# Patient Record
Sex: MALE | Race: WHITE | NOT HISPANIC OR LATINO | Employment: OTHER | URBAN - METROPOLITAN AREA
[De-identification: names, ages, dates, MRNs, and addresses within clinical notes are randomized per-mention and may not be internally consistent; named-entity substitution may affect disease eponyms.]

---

## 2023-06-22 LAB
ALANINE AMINOTRANSFERASE (SGPT) (U/L) IN SER/PLAS: 20 U/L (ref 10–52)
ALBUMIN (G/DL) IN SER/PLAS: 4.2 G/DL (ref 3.4–5)
ALKALINE PHOSPHATASE (U/L) IN SER/PLAS: 51 U/L (ref 33–120)
ANION GAP IN SER/PLAS: 13 MMOL/L (ref 10–20)
ASPARTATE AMINOTRANSFERASE (SGOT) (U/L) IN SER/PLAS: 16 U/L (ref 9–39)
BASOPHILS (10*3/UL) IN BLOOD BY AUTOMATED COUNT: 0.03 X10E9/L (ref 0–0.1)
BASOPHILS/100 LEUKOCYTES IN BLOOD BY AUTOMATED COUNT: 0.4 % (ref 0–2)
BILIRUBIN TOTAL (MG/DL) IN SER/PLAS: 0.5 MG/DL (ref 0–1.2)
CALCIUM (MG/DL) IN SER/PLAS: 9.3 MG/DL (ref 8.6–10.3)
CARBON DIOXIDE, TOTAL (MMOL/L) IN SER/PLAS: 25 MMOL/L (ref 21–32)
CHLORIDE (MMOL/L) IN SER/PLAS: 108 MMOL/L (ref 98–107)
CHOLESTEROL (MG/DL) IN SER/PLAS: 198 MG/DL (ref 0–199)
CHOLESTEROL IN HDL (MG/DL) IN SER/PLAS: 53.9 MG/DL
CHOLESTEROL/HDL RATIO: 3.7
CREATININE (MG/DL) IN SER/PLAS: 0.94 MG/DL (ref 0.5–1.3)
EOSINOPHILS (10*3/UL) IN BLOOD BY AUTOMATED COUNT: 0.15 X10E9/L (ref 0–0.7)
EOSINOPHILS/100 LEUKOCYTES IN BLOOD BY AUTOMATED COUNT: 1.9 % (ref 0–6)
ERYTHROCYTE DISTRIBUTION WIDTH (RATIO) BY AUTOMATED COUNT: 13.8 % (ref 11.5–14.5)
ERYTHROCYTE MEAN CORPUSCULAR HEMOGLOBIN CONCENTRATION (G/DL) BY AUTOMATED: 31.8 G/DL (ref 32–36)
ERYTHROCYTE MEAN CORPUSCULAR VOLUME (FL) BY AUTOMATED COUNT: 98 FL (ref 80–100)
ERYTHROCYTES (10*6/UL) IN BLOOD BY AUTOMATED COUNT: 4.35 X10E12/L (ref 4.5–5.9)
GFR MALE: >90 ML/MIN/1.73M2
GLUCOSE (MG/DL) IN SER/PLAS: 94 MG/DL (ref 74–99)
HEMATOCRIT (%) IN BLOOD BY AUTOMATED COUNT: 42.5 % (ref 41–52)
HEMOGLOBIN (G/DL) IN BLOOD: 13.5 G/DL (ref 13.5–17.5)
IMMATURE GRANULOCYTES/100 LEUKOCYTES IN BLOOD BY AUTOMATED COUNT: 0.5 % (ref 0–0.9)
LDL: 111 MG/DL (ref 0–99)
LEUKOCYTES (10*3/UL) IN BLOOD BY AUTOMATED COUNT: 8 X10E9/L (ref 4.4–11.3)
LYMPHOCYTES (10*3/UL) IN BLOOD BY AUTOMATED COUNT: 2.06 X10E9/L (ref 1.2–4.8)
LYMPHOCYTES/100 LEUKOCYTES IN BLOOD BY AUTOMATED COUNT: 25.7 % (ref 13–44)
MONOCYTES (10*3/UL) IN BLOOD BY AUTOMATED COUNT: 1.04 X10E9/L (ref 0.1–1)
MONOCYTES/100 LEUKOCYTES IN BLOOD BY AUTOMATED COUNT: 13 % (ref 2–10)
NEUTROPHILS (10*3/UL) IN BLOOD BY AUTOMATED COUNT: 4.71 X10E9/L (ref 1.2–7.7)
NEUTROPHILS/100 LEUKOCYTES IN BLOOD BY AUTOMATED COUNT: 58.5 % (ref 40–80)
NRBC (PER 100 WBCS) BY AUTOMATED COUNT: 0 /100 WBC (ref 0–0)
PLATELETS (10*3/UL) IN BLOOD AUTOMATED COUNT: 203 X10E9/L (ref 150–450)
POTASSIUM (MMOL/L) IN SER/PLAS: 4.2 MMOL/L (ref 3.5–5.3)
PROTEIN TOTAL: 7.1 G/DL (ref 6.4–8.2)
SODIUM (MMOL/L) IN SER/PLAS: 142 MMOL/L (ref 136–145)
TRIGLYCERIDE (MG/DL) IN SER/PLAS: 166 MG/DL (ref 0–149)
UREA NITROGEN (MG/DL) IN SER/PLAS: 19 MG/DL (ref 6–23)
VLDL: 33 MG/DL (ref 0–40)

## 2023-06-28 ENCOUNTER — LAB (OUTPATIENT)
Dept: LAB | Facility: LAB | Age: 53
End: 2023-06-28
Payer: COMMERCIAL

## 2023-06-28 ENCOUNTER — OFFICE VISIT (OUTPATIENT)
Dept: PRIMARY CARE | Facility: CLINIC | Age: 53
End: 2023-06-28
Payer: COMMERCIAL

## 2023-06-28 VITALS
WEIGHT: 315 LBS | HEIGHT: 70 IN | SYSTOLIC BLOOD PRESSURE: 120 MMHG | BODY MASS INDEX: 45.1 KG/M2 | DIASTOLIC BLOOD PRESSURE: 74 MMHG | TEMPERATURE: 98.4 F | RESPIRATION RATE: 16 BRPM | HEART RATE: 80 BPM

## 2023-06-28 DIAGNOSIS — Z00.00 ROUTINE GENERAL MEDICAL EXAMINATION AT A HEALTH CARE FACILITY: ICD-10-CM

## 2023-06-28 DIAGNOSIS — R79.89 ABNORMAL CBC: ICD-10-CM

## 2023-06-28 DIAGNOSIS — E78.5 DYSLIPIDEMIA: Primary | ICD-10-CM

## 2023-06-28 DIAGNOSIS — I10 HYPERTENSION, UNSPECIFIED TYPE: ICD-10-CM

## 2023-06-28 DIAGNOSIS — M25.59 PAIN IN OTHER JOINT: ICD-10-CM

## 2023-06-28 DIAGNOSIS — J45.909 ASTHMA, UNSPECIFIED ASTHMA SEVERITY, UNSPECIFIED WHETHER COMPLICATED, UNSPECIFIED WHETHER PERSISTENT (HHS-HCC): ICD-10-CM

## 2023-06-28 DIAGNOSIS — Z12.5 PROSTATE CANCER SCREENING: ICD-10-CM

## 2023-06-28 DIAGNOSIS — L03.90 CELLULITIS, UNSPECIFIED CELLULITIS SITE: ICD-10-CM

## 2023-06-28 PROBLEM — I83.812 VARICOSE VEINS OF LEFT LOWER EXTREMITY WITH PAIN: Status: ACTIVE | Noted: 2018-09-05

## 2023-06-28 PROBLEM — E66.01 MORBID OBESITY (MULTI): Status: ACTIVE | Noted: 2023-06-28

## 2023-06-28 PROBLEM — J30.9 ALLERGIC RHINITIS: Status: ACTIVE | Noted: 2023-06-28

## 2023-06-28 PROCEDURE — 83540 ASSAY OF IRON: CPT

## 2023-06-28 PROCEDURE — 99214 OFFICE O/P EST MOD 30 MIN: CPT | Performed by: INTERNAL MEDICINE

## 2023-06-28 PROCEDURE — 1036F TOBACCO NON-USER: CPT | Performed by: INTERNAL MEDICINE

## 2023-06-28 PROCEDURE — 83550 IRON BINDING TEST: CPT

## 2023-06-28 PROCEDURE — 36415 COLL VENOUS BLD VENIPUNCTURE: CPT

## 2023-06-28 PROCEDURE — 82728 ASSAY OF FERRITIN: CPT

## 2023-06-28 PROCEDURE — 3078F DIAST BP <80 MM HG: CPT | Performed by: INTERNAL MEDICINE

## 2023-06-28 PROCEDURE — 3074F SYST BP LT 130 MM HG: CPT | Performed by: INTERNAL MEDICINE

## 2023-06-28 RX ORDER — ALBUTEROL SULFATE 90 UG/1
2 AEROSOL, METERED RESPIRATORY (INHALATION) EVERY 4 HOURS PRN
COMMUNITY
Start: 2017-11-28 | End: 2023-10-06 | Stop reason: SDUPTHER

## 2023-06-28 RX ORDER — LISINOPRIL 10 MG/1
10 TABLET ORAL
Qty: 90 TABLET | Refills: 0 | Status: SHIPPED | OUTPATIENT
Start: 2023-06-28 | End: 2023-10-06 | Stop reason: SDUPTHER

## 2023-06-28 RX ORDER — ALBUTEROL SULFATE 90 UG/1
2 AEROSOL, METERED RESPIRATORY (INHALATION) EVERY 4 HOURS PRN
Qty: 18 G | Refills: 2 | Status: CANCELLED | OUTPATIENT
Start: 2023-06-28

## 2023-06-28 RX ORDER — FLUTICASONE PROPIONATE 50 MCG
1 SPRAY, SUSPENSION (ML) NASAL
Qty: 16 G | Refills: 2 | Status: SHIPPED | OUTPATIENT
Start: 2023-06-28 | End: 2024-01-03 | Stop reason: SDUPTHER

## 2023-06-28 RX ORDER — ETODOLAC 400 MG/1
400 TABLET, FILM COATED ORAL 2 TIMES DAILY
COMMUNITY
End: 2023-06-28 | Stop reason: SDUPTHER

## 2023-06-28 RX ORDER — MONTELUKAST SODIUM 10 MG/1
10 TABLET ORAL NIGHTLY
COMMUNITY
Start: 2018-02-28 | End: 2023-06-28 | Stop reason: SDUPTHER

## 2023-06-28 RX ORDER — LISINOPRIL 10 MG/1
10 TABLET ORAL
COMMUNITY
End: 2023-06-28 | Stop reason: SDUPTHER

## 2023-06-28 RX ORDER — ETODOLAC 400 MG/1
400 TABLET, FILM COATED ORAL 2 TIMES DAILY
Qty: 180 TABLET | Refills: 0 | Status: SHIPPED | OUTPATIENT
Start: 2023-06-28 | End: 2023-10-06 | Stop reason: SDUPTHER

## 2023-06-28 RX ORDER — FLUTICASONE PROPIONATE 50 MCG
1 SPRAY, SUSPENSION (ML) NASAL
COMMUNITY
Start: 2018-09-17 | End: 2023-06-28 | Stop reason: SDUPTHER

## 2023-06-28 RX ORDER — MONTELUKAST SODIUM 10 MG/1
10 TABLET ORAL NIGHTLY
Qty: 90 TABLET | Refills: 0 | Status: SHIPPED | OUTPATIENT
Start: 2023-06-28 | End: 2023-10-06 | Stop reason: SDUPTHER

## 2023-06-28 ASSESSMENT — COLUMBIA-SUICIDE SEVERITY RATING SCALE - C-SSRS
6. HAVE YOU EVER DONE ANYTHING, STARTED TO DO ANYTHING, OR PREPARED TO DO ANYTHING TO END YOUR LIFE?: NO
2. HAVE YOU ACTUALLY HAD ANY THOUGHTS OF KILLING YOURSELF?: NO
1. IN THE PAST MONTH, HAVE YOU WISHED YOU WERE DEAD OR WISHED YOU COULD GO TO SLEEP AND NOT WAKE UP?: NO

## 2023-06-28 ASSESSMENT — PATIENT HEALTH QUESTIONNAIRE - PHQ9
SUM OF ALL RESPONSES TO PHQ9 QUESTIONS 1 AND 2: 0
2. FEELING DOWN, DEPRESSED OR HOPELESS: NOT AT ALL
1. LITTLE INTEREST OR PLEASURE IN DOING THINGS: NOT AT ALL

## 2023-06-28 ASSESSMENT — ENCOUNTER SYMPTOMS: DEPRESSION: 0

## 2023-06-28 NOTE — PROGRESS NOTES
"Subjective   Patient ID: Neptali Estrada is a 52 y.o. male who presents for Follow-up (BW results/Pt was seen in  ER on 6/22/23 for a infection in RLL).  HPI  Keflex and doxy x 1 w    Patient presents today for follow-up for routine for his lipids.  He also has cellulitis last week he has chronic lower extremity edema secondary to arthritic problems and weight gain.  He has seen the vascular doctor Dr. Weldon at [facility] in the past and he has compression hose although he is having trouble getting a new pair.  He developed this rash about 1 week ago and was to the ER was given some IV antibiotics and continued on Keflex and doxy.  His leg is much better today there is no pain there is no warmth he has resolving redness    He is also here to follow-up on his cholesterol and his blood pressure.  His blood pressure is good we reviewed his CMP and lipid panel as well as his CBC.  He needs his iron test    Patient denies chest pain shortness of breath headaches dizziness lightheadedness or shortness of breath he has chronic lower extremity edema he has tried multiple avenues to get new hose and has failed he is going to contact his vascular doctor for different order    Review of Systems  Review of systems was performed and is otherwise negative except as noted in HPI.  Objective   /74   Pulse 80   Temp 36.9 °C (98.4 °F)   Resp 16   Ht 1.778 m (5' 10\")   Wt (!) 186 kg (411 lb)   BMI 58.97 kg/m²    Physical Exam   HEENT is normal  Lungs clear bilaterally  Heart is regular rate and rhythm no murmurs  Lower extremities he has hose bilaterally under the right he has some resolving redness there is no warmth there is no pus there is no drainage there is no open skin and there is no streaks    Assessment/Plan   Diagnoses and all orders for this visit:  Dyslipidemia  Hypertension, unspecified type  -     lisinopril 10 mg tablet; Take 1 tablet (10 mg) by mouth once daily.  Asthma, unspecified asthma severity, unspecified " whether complicated, unspecified whether persistent  -     fluticasone (Flonase) 50 mcg/actuation nasal spray; Administer 1 spray into each nostril once daily.  -     montelukast (Singulair) 10 mg tablet; Take 1 tablet (10 mg) by mouth once daily at bedtime.  Cellulitis, unspecified cellulitis site  Abnormal CBC  -     Iron and TIBC; Future  -     Ferritin; Future  Pain in other joint  -     etodolac (Lodine) 400 mg tablet; Take 1 tablet (400 mg) by mouth 2 times a day.    He is good to follow-up with vascular regarding his cellulitis and getting his new hose  His refills are sent  Blood work is ordered to check his iron  He needs to follow-up with me in 6 months  He will call with issues  He will need blood work before that appointment  Lynne Magana MD

## 2023-06-29 LAB
FERRITIN (UG/LL) IN SER/PLAS: 391 UG/L (ref 20–300)
IRON (UG/DL) IN SER/PLAS: 78 UG/DL (ref 35–150)
IRON BINDING CAPACITY (UG/DL) IN SER/PLAS: 348 UG/DL (ref 240–445)
IRON SATURATION (%) IN SER/PLAS: 22 % (ref 25–45)

## 2023-08-23 LAB
ALANINE AMINOTRANSFERASE (SGPT) (U/L) IN SER/PLAS: 16 U/L (ref 10–52)
ALBUMIN (G/DL) IN SER/PLAS: 4.4 G/DL (ref 3.4–5)
ALKALINE PHOSPHATASE (U/L) IN SER/PLAS: 50 U/L (ref 33–120)
ANION GAP IN SER/PLAS: 11 MMOL/L (ref 10–20)
ASPARTATE AMINOTRANSFERASE (SGOT) (U/L) IN SER/PLAS: 16 U/L (ref 9–39)
BASOPHILS (10*3/UL) IN BLOOD BY AUTOMATED COUNT: 0.05 X10E9/L (ref 0–0.1)
BASOPHILS/100 LEUKOCYTES IN BLOOD BY AUTOMATED COUNT: 0.6 % (ref 0–2)
BILIRUBIN TOTAL (MG/DL) IN SER/PLAS: 0.6 MG/DL (ref 0–1.2)
CALCIUM (MG/DL) IN SER/PLAS: 9.4 MG/DL (ref 8.6–10.3)
CARBON DIOXIDE, TOTAL (MMOL/L) IN SER/PLAS: 28 MMOL/L (ref 21–32)
CHLORIDE (MMOL/L) IN SER/PLAS: 105 MMOL/L (ref 98–107)
CREATININE (MG/DL) IN SER/PLAS: 1.06 MG/DL (ref 0.5–1.3)
EOSINOPHILS (10*3/UL) IN BLOOD BY AUTOMATED COUNT: 0.11 X10E9/L (ref 0–0.7)
EOSINOPHILS/100 LEUKOCYTES IN BLOOD BY AUTOMATED COUNT: 1.3 % (ref 0–6)
ERYTHROCYTE DISTRIBUTION WIDTH (RATIO) BY AUTOMATED COUNT: 13.2 % (ref 11.5–14.5)
ERYTHROCYTE MEAN CORPUSCULAR HEMOGLOBIN CONCENTRATION (G/DL) BY AUTOMATED: 32.1 G/DL (ref 32–36)
ERYTHROCYTE MEAN CORPUSCULAR VOLUME (FL) BY AUTOMATED COUNT: 97 FL (ref 80–100)
ERYTHROCYTES (10*6/UL) IN BLOOD BY AUTOMATED COUNT: 4.3 X10E12/L (ref 4.5–5.9)
GFR MALE: 83 ML/MIN/1.73M2
GLUCOSE (MG/DL) IN SER/PLAS: 79 MG/DL (ref 74–99)
HEMATOCRIT (%) IN BLOOD BY AUTOMATED COUNT: 41.7 % (ref 41–52)
HEMOGLOBIN (G/DL) IN BLOOD: 13.4 G/DL (ref 13.5–17.5)
IMMATURE GRANULOCYTES/100 LEUKOCYTES IN BLOOD BY AUTOMATED COUNT: 0.3 % (ref 0–0.9)
LEUKOCYTES (10*3/UL) IN BLOOD BY AUTOMATED COUNT: 8.6 X10E9/L (ref 4.4–11.3)
LYMPHOCYTES (10*3/UL) IN BLOOD BY AUTOMATED COUNT: 2.08 X10E9/L (ref 1.2–4.8)
LYMPHOCYTES/100 LEUKOCYTES IN BLOOD BY AUTOMATED COUNT: 24.2 % (ref 13–44)
MONOCYTES (10*3/UL) IN BLOOD BY AUTOMATED COUNT: 0.63 X10E9/L (ref 0.1–1)
MONOCYTES/100 LEUKOCYTES IN BLOOD BY AUTOMATED COUNT: 7.3 % (ref 2–10)
NEUTROPHILS (10*3/UL) IN BLOOD BY AUTOMATED COUNT: 5.71 X10E9/L (ref 1.2–7.7)
NEUTROPHILS/100 LEUKOCYTES IN BLOOD BY AUTOMATED COUNT: 66.3 % (ref 40–80)
NRBC (PER 100 WBCS) BY AUTOMATED COUNT: 0 /100 WBC (ref 0–0)
PLATELETS (10*3/UL) IN BLOOD AUTOMATED COUNT: 241 X10E9/L (ref 150–450)
POTASSIUM (MMOL/L) IN SER/PLAS: 4.4 MMOL/L (ref 3.5–5.3)
PROTEIN TOTAL: 7.2 G/DL (ref 6.4–8.2)
SODIUM (MMOL/L) IN SER/PLAS: 140 MMOL/L (ref 136–145)
UREA NITROGEN (MG/DL) IN SER/PLAS: 19 MG/DL (ref 6–23)

## 2023-09-18 ENCOUNTER — TELEPHONE (OUTPATIENT)
Dept: PRIMARY CARE | Facility: CLINIC | Age: 53
End: 2023-09-18

## 2023-09-18 NOTE — TELEPHONE ENCOUNTER
Pharmacy called ldm on machine requesting Rx refill on the   Compound Pain Cream for the patient.  There is a fax refill request   in your middle bin.  PLS 06/28/23

## 2023-10-06 DIAGNOSIS — I10 HYPERTENSION, UNSPECIFIED TYPE: ICD-10-CM

## 2023-10-06 DIAGNOSIS — J45.909 REACTIVE AIRWAY DISEASE WITHOUT COMPLICATION, UNSPECIFIED ASTHMA SEVERITY, UNSPECIFIED WHETHER PERSISTENT (HHS-HCC): ICD-10-CM

## 2023-10-06 DIAGNOSIS — J45.909 ASTHMA, UNSPECIFIED ASTHMA SEVERITY, UNSPECIFIED WHETHER COMPLICATED, UNSPECIFIED WHETHER PERSISTENT (HHS-HCC): ICD-10-CM

## 2023-10-06 DIAGNOSIS — M25.59 PAIN IN OTHER JOINT: ICD-10-CM

## 2023-10-06 RX ORDER — LISINOPRIL 10 MG/1
10 TABLET ORAL
Qty: 90 TABLET | Refills: 0 | Status: SHIPPED | OUTPATIENT
Start: 2023-10-06 | End: 2023-10-10 | Stop reason: DRUGHIGH

## 2023-10-06 RX ORDER — ETODOLAC 400 MG/1
400 TABLET, FILM COATED ORAL 2 TIMES DAILY
Qty: 180 TABLET | Refills: 0 | Status: SHIPPED | OUTPATIENT
Start: 2023-10-06 | End: 2024-01-03 | Stop reason: SDUPTHER

## 2023-10-06 RX ORDER — MONTELUKAST SODIUM 10 MG/1
10 TABLET ORAL NIGHTLY
Qty: 90 TABLET | Refills: 0 | Status: SHIPPED | OUTPATIENT
Start: 2023-10-06 | End: 2024-01-03 | Stop reason: SDUPTHER

## 2023-10-06 RX ORDER — LISINOPRIL 20 MG/1
20 TABLET ORAL DAILY
Qty: 30 TABLET | Refills: 0 | OUTPATIENT
Start: 2023-10-06

## 2023-10-06 RX ORDER — ALBUTEROL SULFATE 90 UG/1
2 AEROSOL, METERED RESPIRATORY (INHALATION) EVERY 4 HOURS PRN
Qty: 18 G | Refills: 1 | Status: SHIPPED | OUTPATIENT
Start: 2023-10-06 | End: 2024-01-03 | Stop reason: SDUPTHER

## 2023-10-10 ENCOUNTER — TELEPHONE (OUTPATIENT)
Dept: PRIMARY CARE | Facility: CLINIC | Age: 53
End: 2023-10-10

## 2023-10-10 DIAGNOSIS — I10 ESSENTIAL HYPERTENSION: Primary | ICD-10-CM

## 2023-10-10 RX ORDER — LISINOPRIL 20 MG/1
20 TABLET ORAL DAILY
COMMUNITY
End: 2023-10-10 | Stop reason: SDUPTHER

## 2023-10-10 RX ORDER — LISINOPRIL 20 MG/1
20 TABLET ORAL DAILY
Qty: 90 TABLET | Refills: 0 | Status: SHIPPED | OUTPATIENT
Start: 2023-10-10 | End: 2024-01-03 | Stop reason: SDUPTHER

## 2023-10-10 NOTE — TELEPHONE ENCOUNTER
Pt called stating when he went to the pharmacy to  his lisinopril it was for the wrong dose.  It was sent in for 10 mg  and should be 20 mg.  Please resend to [facility].

## 2023-10-20 DIAGNOSIS — J45.909 ASTHMA, UNSPECIFIED ASTHMA SEVERITY, UNSPECIFIED WHETHER COMPLICATED, UNSPECIFIED WHETHER PERSISTENT (HHS-HCC): ICD-10-CM

## 2023-10-20 RX ORDER — CETIRIZINE HYDROCHLORIDE 10 MG/1
10 TABLET ORAL DAILY
COMMUNITY
End: 2024-01-03 | Stop reason: ALTCHOICE

## 2023-10-20 RX ORDER — CETIRIZINE HYDROCHLORIDE 10 MG/1
10 TABLET ORAL DAILY
Qty: 30 TABLET | Refills: 2 | Status: SHIPPED | OUTPATIENT
Start: 2023-10-20 | End: 2024-01-03 | Stop reason: SDUPTHER

## 2023-12-01 DIAGNOSIS — G89.29 CHRONIC PAIN OF LEFT KNEE: ICD-10-CM

## 2023-12-01 DIAGNOSIS — M25.562 CHRONIC PAIN OF LEFT KNEE: ICD-10-CM

## 2023-12-05 ENCOUNTER — OFFICE VISIT (OUTPATIENT)
Dept: ORTHOPEDIC SURGERY | Facility: CLINIC | Age: 53
End: 2023-12-05
Payer: COMMERCIAL

## 2023-12-05 ENCOUNTER — ANCILLARY PROCEDURE (OUTPATIENT)
Dept: RADIOLOGY | Facility: CLINIC | Age: 53
End: 2023-12-05
Payer: COMMERCIAL

## 2023-12-05 DIAGNOSIS — G89.29 CHRONIC PAIN OF LEFT KNEE: ICD-10-CM

## 2023-12-05 DIAGNOSIS — M25.562 CHRONIC PAIN OF LEFT KNEE: ICD-10-CM

## 2023-12-05 DIAGNOSIS — M17.12 ARTHRITIS OF KNEE, LEFT: Primary | ICD-10-CM

## 2023-12-05 PROCEDURE — 73562 X-RAY EXAM OF KNEE 3: CPT | Mod: LEFT SIDE | Performed by: RADIOLOGY

## 2023-12-05 PROCEDURE — 99213 OFFICE O/P EST LOW 20 MIN: CPT | Performed by: ORTHOPAEDIC SURGERY

## 2023-12-05 PROCEDURE — 73562 X-RAY EXAM OF KNEE 3: CPT | Mod: LT

## 2023-12-05 PROCEDURE — 20610 DRAIN/INJ JOINT/BURSA W/O US: CPT | Performed by: ORTHOPAEDIC SURGERY

## 2023-12-05 PROCEDURE — 1036F TOBACCO NON-USER: CPT | Performed by: ORTHOPAEDIC SURGERY

## 2023-12-05 RX ORDER — LIDOCAINE HYDROCHLORIDE 10 MG/ML
2 INJECTION INFILTRATION; PERINEURAL
Status: COMPLETED | OUTPATIENT
Start: 2023-12-05 | End: 2023-12-05

## 2023-12-05 RX ORDER — TRIAMCINOLONE ACETONIDE 40 MG/ML
40 INJECTION, SUSPENSION INTRA-ARTICULAR; INTRAMUSCULAR
Status: COMPLETED | OUTPATIENT
Start: 2023-12-05 | End: 2023-12-05

## 2023-12-05 RX ADMIN — TRIAMCINOLONE ACETONIDE 40 MG: 40 INJECTION, SUSPENSION INTRA-ARTICULAR; INTRAMUSCULAR at 12:32

## 2023-12-05 RX ADMIN — LIDOCAINE HYDROCHLORIDE 2 ML: 10 INJECTION INFILTRATION; PERINEURAL at 12:32

## 2023-12-05 NOTE — PROGRESS NOTES
53-year-old is seen with left knee pain.  He has been having persistent severe sharp shooting pain in the left knee.  He has difficulty with standing and walking going up and down stairs and getting up and down from a chair in and out of a car.  Viscosupplementation and cortisone have helped him in the past.  He is working on weight loss and works with a nutritionist.    Pleasant and no acute distress. Walks with antalgic gait. Stands with varus alignment of the left knee and neutral on the right. Left knee range of motion is 5-110°. The knee is stable to varus and valgus stress Lachman and posterior drawer. There is a mild effusion. There is generalized tenderness. Right knee range of motion is 0-120°. There is no effusion. The knee is stable to varus and valgus stress Lachman and posterior drawer. Both lower extremities are well perfused the skin is intact and muscle tone is adequate.    A discussion about knee arthritis was done.  Treatment options were reviewed and the decision was made to proceed with cortisone injection.  Viscosupplementation injections will also be repeated as these have helped him.  He has also been advised to work with the weight management program to work further on weight loss.  It is current weight he would not be a surgical candidate.    L Inj/Asp: L knee on 12/5/2023 12:32 PM  Indications: pain  Details: 22 G needle, anterolateral approach  Medications: 40 mg triamcinolone acetonide 40 mg/mL; 2 mL lidocaine 10 mg/mL (1 %)  Procedure, treatment alternatives, risks and benefits explained, specific risks discussed. Consent was given by the patient.

## 2023-12-26 ENCOUNTER — LAB (OUTPATIENT)
Dept: LAB | Facility: LAB | Age: 53
End: 2023-12-26
Payer: COMMERCIAL

## 2023-12-26 DIAGNOSIS — Z00.00 ROUTINE GENERAL MEDICAL EXAMINATION AT A HEALTH CARE FACILITY: ICD-10-CM

## 2023-12-26 DIAGNOSIS — Z12.5 PROSTATE CANCER SCREENING: ICD-10-CM

## 2023-12-26 LAB
ALBUMIN SERPL BCP-MCNC: 4.3 G/DL (ref 3.4–5)
ALP SERPL-CCNC: 59 U/L (ref 33–120)
ALT SERPL W P-5'-P-CCNC: 18 U/L (ref 10–52)
ANION GAP SERPL CALC-SCNC: 12 MMOL/L (ref 10–20)
AST SERPL W P-5'-P-CCNC: 15 U/L (ref 9–39)
BASOPHILS # BLD AUTO: 0.03 X10*3/UL (ref 0–0.1)
BASOPHILS NFR BLD AUTO: 0.4 %
BILIRUB SERPL-MCNC: 0.4 MG/DL (ref 0–1.2)
BUN SERPL-MCNC: 17 MG/DL (ref 6–23)
CALCIUM SERPL-MCNC: 9.1 MG/DL (ref 8.6–10.3)
CHLORIDE SERPL-SCNC: 105 MMOL/L (ref 98–107)
CHOLEST SERPL-MCNC: 220 MG/DL (ref 0–199)
CHOLESTEROL/HDL RATIO: 4.6
CO2 SERPL-SCNC: 28 MMOL/L (ref 21–32)
CREAT SERPL-MCNC: 0.98 MG/DL (ref 0.5–1.3)
EOSINOPHIL # BLD AUTO: 0.12 X10*3/UL (ref 0–0.7)
EOSINOPHIL NFR BLD AUTO: 1.6 %
ERYTHROCYTE [DISTWIDTH] IN BLOOD BY AUTOMATED COUNT: 13.2 % (ref 11.5–14.5)
GFR SERPL CREATININE-BSD FRML MDRD: >90 ML/MIN/1.73M*2
GLUCOSE SERPL-MCNC: 83 MG/DL (ref 74–99)
HCT VFR BLD AUTO: 43.7 % (ref 41–52)
HDLC SERPL-MCNC: 47.6 MG/DL
HGB BLD-MCNC: 13.9 G/DL (ref 13.5–17.5)
IMM GRANULOCYTES # BLD AUTO: 0.04 X10*3/UL (ref 0–0.7)
IMM GRANULOCYTES NFR BLD AUTO: 0.5 % (ref 0–0.9)
LDLC SERPL CALC-MCNC: 134 MG/DL
LYMPHOCYTES # BLD AUTO: 2.09 X10*3/UL (ref 1.2–4.8)
LYMPHOCYTES NFR BLD AUTO: 27.2 %
MCH RBC QN AUTO: 31.4 PG (ref 26–34)
MCHC RBC AUTO-ENTMCNC: 31.8 G/DL (ref 32–36)
MCV RBC AUTO: 99 FL (ref 80–100)
MONOCYTES # BLD AUTO: 0.66 X10*3/UL (ref 0.1–1)
MONOCYTES NFR BLD AUTO: 8.6 %
NEUTROPHILS # BLD AUTO: 4.75 X10*3/UL (ref 1.2–7.7)
NEUTROPHILS NFR BLD AUTO: 61.7 %
NON HDL CHOLESTEROL: 172 MG/DL (ref 0–149)
NRBC BLD-RTO: 0 /100 WBCS (ref 0–0)
PLATELET # BLD AUTO: 242 X10*3/UL (ref 150–450)
POTASSIUM SERPL-SCNC: 4.6 MMOL/L (ref 3.5–5.3)
PROT SERPL-MCNC: 6.9 G/DL (ref 6.4–8.2)
PSA SERPL-MCNC: 0.97 NG/ML
RBC # BLD AUTO: 4.42 X10*6/UL (ref 4.5–5.9)
SODIUM SERPL-SCNC: 140 MMOL/L (ref 136–145)
TRIGL SERPL-MCNC: 193 MG/DL (ref 0–149)
TSH SERPL-ACNC: 1.1 MIU/L (ref 0.44–3.98)
VLDL: 39 MG/DL (ref 0–40)
WBC # BLD AUTO: 7.7 X10*3/UL (ref 4.4–11.3)

## 2023-12-26 PROCEDURE — 84443 ASSAY THYROID STIM HORMONE: CPT

## 2023-12-26 PROCEDURE — 84153 ASSAY OF PSA TOTAL: CPT

## 2023-12-26 PROCEDURE — 36415 COLL VENOUS BLD VENIPUNCTURE: CPT

## 2023-12-26 PROCEDURE — 85025 COMPLETE CBC W/AUTO DIFF WBC: CPT

## 2023-12-26 PROCEDURE — 80061 LIPID PANEL: CPT

## 2023-12-26 PROCEDURE — 80053 COMPREHEN METABOLIC PANEL: CPT

## 2024-01-03 ENCOUNTER — TELEPHONE (OUTPATIENT)
Dept: PRIMARY CARE | Facility: CLINIC | Age: 54
End: 2024-01-03

## 2024-01-03 ENCOUNTER — LAB (OUTPATIENT)
Dept: LAB | Facility: LAB | Age: 54
End: 2024-01-03
Payer: COMMERCIAL

## 2024-01-03 ENCOUNTER — OFFICE VISIT (OUTPATIENT)
Dept: PRIMARY CARE | Facility: CLINIC | Age: 54
End: 2024-01-03
Payer: COMMERCIAL

## 2024-01-03 VITALS
HEART RATE: 108 BPM | BODY MASS INDEX: 45.1 KG/M2 | DIASTOLIC BLOOD PRESSURE: 90 MMHG | SYSTOLIC BLOOD PRESSURE: 132 MMHG | OXYGEN SATURATION: 95 % | HEIGHT: 70 IN | WEIGHT: 315 LBS | TEMPERATURE: 98.1 F

## 2024-01-03 DIAGNOSIS — J45.909 REACTIVE AIRWAY DISEASE WITHOUT COMPLICATION, UNSPECIFIED ASTHMA SEVERITY, UNSPECIFIED WHETHER PERSISTENT (HHS-HCC): ICD-10-CM

## 2024-01-03 DIAGNOSIS — J45.909 ASTHMA, UNSPECIFIED ASTHMA SEVERITY, UNSPECIFIED WHETHER COMPLICATED, UNSPECIFIED WHETHER PERSISTENT (HHS-HCC): ICD-10-CM

## 2024-01-03 DIAGNOSIS — R79.89 ABNORMAL CBC: ICD-10-CM

## 2024-01-03 DIAGNOSIS — E78.5 DYSLIPIDEMIA: ICD-10-CM

## 2024-01-03 DIAGNOSIS — Z12.11 COLON CANCER SCREENING: ICD-10-CM

## 2024-01-03 DIAGNOSIS — M25.59 PAIN IN OTHER JOINT: ICD-10-CM

## 2024-01-03 DIAGNOSIS — I10 ESSENTIAL HYPERTENSION: Primary | ICD-10-CM

## 2024-01-03 DIAGNOSIS — E66.1 CLASS 3 DRUG-INDUCED OBESITY WITH BODY MASS INDEX (BMI) OF 50.0 TO 59.9 IN ADULT, UNSPECIFIED WHETHER SERIOUS COMORBIDITY PRESENT (MULTI): ICD-10-CM

## 2024-01-03 PROBLEM — E66.813 CLASS 3 DRUG-INDUCED OBESITY WITH BODY MASS INDEX (BMI) OF 50.0 TO 59.9 IN ADULT: Status: ACTIVE | Noted: 2024-01-03

## 2024-01-03 LAB
FERRITIN SERPL-MCNC: 389 NG/ML (ref 20–300)
IRON SATN MFR SERPL: 26 % (ref 25–45)
IRON SERPL-MCNC: 89 UG/DL (ref 35–150)
TIBC SERPL-MCNC: 341 UG/DL (ref 240–445)
UIBC SERPL-MCNC: 252 UG/DL (ref 110–370)

## 2024-01-03 PROCEDURE — 83540 ASSAY OF IRON: CPT

## 2024-01-03 PROCEDURE — 1036F TOBACCO NON-USER: CPT | Performed by: INTERNAL MEDICINE

## 2024-01-03 PROCEDURE — 82728 ASSAY OF FERRITIN: CPT

## 2024-01-03 PROCEDURE — 83550 IRON BINDING TEST: CPT

## 2024-01-03 PROCEDURE — 3008F BODY MASS INDEX DOCD: CPT | Performed by: INTERNAL MEDICINE

## 2024-01-03 PROCEDURE — 3080F DIAST BP >= 90 MM HG: CPT | Performed by: INTERNAL MEDICINE

## 2024-01-03 PROCEDURE — 3075F SYST BP GE 130 - 139MM HG: CPT | Performed by: INTERNAL MEDICINE

## 2024-01-03 PROCEDURE — 99214 OFFICE O/P EST MOD 30 MIN: CPT | Performed by: INTERNAL MEDICINE

## 2024-01-03 PROCEDURE — 36415 COLL VENOUS BLD VENIPUNCTURE: CPT

## 2024-01-03 RX ORDER — LISINOPRIL 20 MG/1
20 TABLET ORAL DAILY
Qty: 30 TABLET | Refills: 2 | Status: SHIPPED | OUTPATIENT
Start: 2024-01-03 | End: 2024-04-03 | Stop reason: SDUPTHER

## 2024-01-03 RX ORDER — ETODOLAC 400 MG/1
400 TABLET, FILM COATED ORAL 2 TIMES DAILY
Qty: 60 TABLET | Refills: 2 | Status: SHIPPED | OUTPATIENT
Start: 2024-01-03 | End: 2024-04-03 | Stop reason: SDUPTHER

## 2024-01-03 RX ORDER — FLUTICASONE PROPIONATE 50 MCG
1 SPRAY, SUSPENSION (ML) NASAL
Qty: 16 G | Refills: 2 | Status: SHIPPED | OUTPATIENT
Start: 2024-01-03 | End: 2024-04-03 | Stop reason: SDUPTHER

## 2024-01-03 RX ORDER — MONTELUKAST SODIUM 10 MG/1
10 TABLET ORAL NIGHTLY
Qty: 30 TABLET | Refills: 2 | Status: SHIPPED | OUTPATIENT
Start: 2024-01-03 | End: 2024-04-03 | Stop reason: SDUPTHER

## 2024-01-03 RX ORDER — ATORVASTATIN CALCIUM 10 MG/1
10 TABLET, FILM COATED ORAL DAILY
Qty: 30 TABLET | Refills: 2 | Status: SHIPPED | OUTPATIENT
Start: 2024-01-03 | End: 2024-04-03 | Stop reason: SDUPTHER

## 2024-01-03 RX ORDER — ALBUTEROL SULFATE 90 UG/1
2 AEROSOL, METERED RESPIRATORY (INHALATION) EVERY 4 HOURS PRN
Qty: 18 G | Refills: 2 | Status: SHIPPED | OUTPATIENT
Start: 2024-01-03

## 2024-01-03 RX ORDER — CETIRIZINE HYDROCHLORIDE 10 MG/1
10 TABLET ORAL DAILY
Qty: 30 TABLET | Refills: 2 | Status: SHIPPED | OUTPATIENT
Start: 2024-01-03 | End: 2024-04-03 | Stop reason: SDUPTHER

## 2024-01-03 ASSESSMENT — PATIENT HEALTH QUESTIONNAIRE - PHQ9
1. LITTLE INTEREST OR PLEASURE IN DOING THINGS: NOT AT ALL
SUM OF ALL RESPONSES TO PHQ9 QUESTIONS 1 AND 2: 0
2. FEELING DOWN, DEPRESSED OR HOPELESS: NOT AT ALL

## 2024-01-03 ASSESSMENT — ENCOUNTER SYMPTOMS
LOSS OF SENSATION IN FEET: 0
DEPRESSION: 0
OCCASIONAL FEELINGS OF UNSTEADINESS: 1

## 2024-01-03 NOTE — PROGRESS NOTES
"Subjective   Patient ID: Neptali Estrada is a 53 y.o. male who presents for Follow-up (EP.  Follow up B/P.  Labs done.  No concerns.).  HPI  Recent cortisone shot left knee and pierce be getting gel    Patient presents today for follow-up of hyperlipidemia hypertension allergic rhinitis.  As well as asthma.  He has been feeling well.  He maintains on his current medications without issues he is still using Lodine for knee pain.  He was had a recent knee injection of cortisone and he plans on getting gel injections when they come in at his doctor's office he states he will be a series of 3 and is hoping that will help him get through the winter.  He has been compliant with his cholesterol medicine and his blood pressure meds.  His blood pressure slightly borderline here in the office today.  Patient states that he is continuing to work on his weight loss efforts trying to watch his diet he is not really able to exercise well because of the knee pain but he wants to continue to lose weight.  He does not have any lower extremity edema.    Review of Systems  Review of systems was performed and is otherwise negative except as noted in HPI.  Objective   BP (!) 136/94   Pulse 108   Temp 36.7 °C (98.1 °F) (Oral)   Ht 1.778 m (5' 10\")   Wt (!) 183 kg (403 lb)   SpO2 95%   BMI 57.82 kg/m²    Physical Exam  HEENT is normal  Lungs clear bilaterally  Heart is regular rate rhythm no murmurs  Abdomen benign  Lower extremities no edema  He has bilateral bony deformities of his knees  Assessment/Plan   Diagnoses and all orders for this visit:  Essential hypertension  -     lisinopril 20 mg tablet; Take 1 tablet (20 mg) by mouth once daily.  Asthma, unspecified asthma severity, unspecified whether complicated, unspecified whether persistent  -     montelukast (Singulair) 10 mg tablet; Take 1 tablet (10 mg) by mouth once daily at bedtime.  -     fluticasone (Flonase) 50 mcg/actuation nasal spray; Administer 1 spray into each nostril " once daily.  -     cetirizine (ZyrTEC) 10 mg tablet; Take 1 tablet (10 mg) by mouth once daily.  Pain in other joint  -     etodolac (Lodine) 400 mg tablet; Take 1 tablet (400 mg) by mouth 2 times a day.  Reactive airway disease without complication, unspecified asthma severity, unspecified whether persistent  -     albuterol (Ventolin HFA) 90 mcg/actuation inhaler; Inhale 2 puffs every 4 hours if needed for wheezing.  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  -     Follow Up In [facility] - PCP - Established; Future    Call with issues  Refills are sent  Have him return for nurse visit for blood pressure check in 2 to 3 weeks  He will continue to work on diet and exercise  Blood work ordered for his next visit  We reviewed his blood work today CBC lipid panel thyroid panel chemistry and PSA  Lynne Magana MD

## 2024-01-04 NOTE — TELEPHONE ENCOUNTER
Please call patient I would like him to return to the office for nurse visit for blood pressure check in about 3 weeks can you please set that up

## 2024-01-17 DIAGNOSIS — M17.12 PRIMARY OSTEOARTHRITIS OF LEFT KNEE: ICD-10-CM

## 2024-01-18 ENCOUNTER — TELEPHONE (OUTPATIENT)
Dept: GASTROENTEROLOGY | Facility: CLINIC | Age: 54
End: 2024-01-18
Payer: COMMERCIAL

## 2024-01-18 RX ORDER — POLYETHYLENE GLYCOL 3350, SODIUM SULFATE ANHYDROUS, SODIUM BICARBONATE, SODIUM CHLORIDE, POTASSIUM CHLORIDE 236; 22.74; 6.74; 5.86; 2.97 G/4L; G/4L; G/4L; G/4L; G/4L
4000 POWDER, FOR SOLUTION ORAL ONCE
Qty: 4000 ML | Refills: 0 | Status: CANCELLED | OUTPATIENT
Start: 2024-01-18 | End: 2024-01-18

## 2024-01-19 ENCOUNTER — TELEMEDICINE (OUTPATIENT)
Dept: GASTROENTEROLOGY | Facility: CLINIC | Age: 54
End: 2024-01-19
Payer: COMMERCIAL

## 2024-01-19 ENCOUNTER — TELEPHONE (OUTPATIENT)
Dept: GASTROENTEROLOGY | Facility: CLINIC | Age: 54
End: 2024-01-19

## 2024-01-19 DIAGNOSIS — Z11.59 ENCOUNTER FOR SCREENING FOR OTHER VIRAL DISEASES: ICD-10-CM

## 2024-01-19 DIAGNOSIS — Z12.11 COLON CANCER SCREENING: Primary | ICD-10-CM

## 2024-01-19 DIAGNOSIS — K44.9 HIATAL HERNIA: ICD-10-CM

## 2024-01-19 PROCEDURE — 99204 OFFICE O/P NEW MOD 45 MIN: CPT | Performed by: STUDENT IN AN ORGANIZED HEALTH CARE EDUCATION/TRAINING PROGRAM

## 2024-01-19 NOTE — PROGRESS NOTES
53-year-old gentleman with history of dyslipidemia hypertension CVA sleep apnea on CPAP, allergic asthma, small hiatal hernia is interviewed virtually because of the storm warning for consideration of colonoscopy.  Patient mentioned having colonoscopy around year 2000 which was unremarkable as per him.  He mentions intermittent small amount of blood in the stools.    EGD 2000 for heartburn, gastritis, small hiatal hernia, biopsy of the stomach unremarkable  Colonoscopy around 8/2000 unremarkable as per patient  Family history reviewed, not pertinent to chief complaint  1 bowel movement per day  Denies NSAIDs, drinks 5 drinks over the weekends, denies marijuana drug use, ex-smoker        The note was created using voice recognition transcription software. Despite proofreading, unintentional typographical errors may be present. Please contact the GI office with any questions or concerns.     Current Medications: reviewed    A 10 point review of system is negative except for what is mentioned in the HPI    Follow up with GI was advised       Vital Signs: Reviewed    Physical Exam:  Not performed because it was a virtual encounter    Investigations:  Labs, radiological imaging and cardiac work up were reviewed      1-screen for hepatitis C    2-BMI 57, advised to consider Ozempic versus others if no contraindications, patient mentioned that he will follow with his primary physician    3-Healthcare maintenance, reported small blood in the stools, will reach out to sleep specialist at [facility] Nicole Starkey for clearance, will follow    4-advised against alcohol overuse

## 2024-01-19 NOTE — TELEPHONE ENCOUNTER
----- Message from Seamus Hamlin MD sent at 1/19/2024  8:48 AM EST -----  Good morning Dariana can you please send a request for clearance for colonoscopy to [facility] NP sleep apnea specialist Nicole pearson thank you

## 2024-01-19 NOTE — TELEPHONE ENCOUNTER
JAMEL for the patient that we are changing his in person visit with Dr. Hamlin on 1/19/24 to a virtual visit. If the patient is unable to change, I instructed him to call the office tomorrow morning to reschedule.

## 2024-01-22 ENCOUNTER — OFFICE VISIT (OUTPATIENT)
Dept: ORTHOPEDIC SURGERY | Facility: CLINIC | Age: 54
End: 2024-01-22
Payer: COMMERCIAL

## 2024-01-22 VITALS — BODY MASS INDEX: 45.1 KG/M2 | WEIGHT: 315 LBS | HEIGHT: 70 IN

## 2024-01-22 DIAGNOSIS — M25.512 ACUTE PAIN OF LEFT SHOULDER: Primary | ICD-10-CM

## 2024-01-22 PROCEDURE — 3008F BODY MASS INDEX DOCD: CPT

## 2024-01-22 PROCEDURE — 99214 OFFICE O/P EST MOD 30 MIN: CPT

## 2024-01-22 PROCEDURE — 1036F TOBACCO NON-USER: CPT

## 2024-01-22 NOTE — PROGRESS NOTES
Subjective    Patient ID: Neptali Estrada is a 53 y.o. male.    Chief Complaint: Follow-up of the Left Shoulder (BICEP)     HPI  This is a pleasant 53-year-old right-hand-dominant male presents to the office for evaluation of left shoulder pain, which has been ongoing since January 13, 2024.  Patient explains that he was reaching with his left arm, when he felt a significant tearing/popping sensation to the anterior proximal aspect of left shoulder.  States that he immediately experienced pain and limited range of motion.  Over the next few days, he began to notice bruising down his left arm.  Explains that with any type of overhead reaching activity, he is apparent seeing a sharp stabbing pain.  He presented to the [facility] emergency department in [place] on Saturday, January 20, where multiple view x-rays of his humerus were obtained.  These x-rays did include his left shoulder and elbow.  X-rays were negative.  He also had an ultrasound of his left upper extremity which was negative for DVT.  He was advised to follow-up with orthopedics.  Presents to the office today with continued complaints of left shoulder pain weakness and limited range of motion.  He continues to have bruising.  He is having significant difficulty with overhead reaching activities as well as reaching behind his back.  He cannot lift objects with his left arm.  He has been taking Tylenol as well as applying heat and ice with very minimal relief of symptoms.  He takes etodolac for bilateral knee pain and was advised by his primary care physician not to take other NSAIDs.  He denies this and paresthesia of left upper extremity.  Patient states that he has had a previous right shoulder arthroscopy in the past many years ago for a labrum tear.  He is currently unemployed.    The patient's past medical, surgical, family, and social history as well as allergies and medications were reviewed and updated in the chart.    Objective   Ortho  Exam  Pleasant and no acute distress.  Left shoulder forward flexion 95°.  External rotation to approximately 60 degrees with pain elicited.  Limited active abduction to 80 degrees.  No effusion or instability.  There is noted to be ecchymosis to medial upper arm.  There is positive Neer and Wise impingement. There is subacromial crepitus and tenderness around the subacromial space.  There is biceps tenderness.  Bulging of bicep muscle distally. There is a positive Douglass's test. No acromioclavicular tenderness. Rotator cuff strength is decreased and there is discomfort with strength testing. Right shoulder forward flexion 180°. No effusion or instability. No impingement or crepitus or tenderness involving the subacromial space. No biceps or acromioclavicular tenderness. There is intact rotator cuff strength. There is adequate range of motion of the cervical spine without pain. Both upper extremities are well perfused skin is intact and muscle tone is adequate. Elbow flexion and extension and wrist flexion and extension strength are intact.    Image Results:  Multiple view x-rays of the left humerus obtained at [facility] on January 20, 2024 personally reviewed, without evidence of acute fracture or dislocation.  Entirety of left shoulder and elbow were also viewed on the x-rays.    Assessment/Plan   Encounter Diagnoses:  Acute pain of left shoulder, left proximal bicep tear, suspicion for left rotator cuff tear    Plan: Discussion with patient about differential diagnoses with review of x-rays.  Based on patient's acute injury, symptoms and physical exam today, there is concern for internal derangement of the left shoulder.  Explained to patient that he has most likely sustained a proximal bicep tear, but there is concern for a rotator cuff tear.  He should obtain an MRI of the left shoulder to assess for rotator cuff damage.  He is aware that there could be possible surgical intervention pending  MRI results.  Explained to patient that in the meantime, he should avoid aggravating activities.  He can continue with Tylenol, topical creams and ice and heat application.  Once MRI results are complete, I will contact patient for further treatment options.    Orders Placed This Encounter    MR shoulder left wo IV contrast

## 2024-01-25 ENCOUNTER — APPOINTMENT (OUTPATIENT)
Dept: PRIMARY CARE | Facility: CLINIC | Age: 54
End: 2024-01-25
Payer: COMMERCIAL

## 2024-01-29 ENCOUNTER — CLINICAL SUPPORT (OUTPATIENT)
Dept: PRIMARY CARE | Facility: CLINIC | Age: 54
End: 2024-01-29
Payer: COMMERCIAL

## 2024-01-29 VITALS — HEART RATE: 88 BPM | DIASTOLIC BLOOD PRESSURE: 80 MMHG | SYSTOLIC BLOOD PRESSURE: 130 MMHG

## 2024-01-29 DIAGNOSIS — I10 HYPERTENSION, UNSPECIFIED TYPE: ICD-10-CM

## 2024-01-29 NOTE — PROGRESS NOTES
Nurse visit for BP check.  Elevated at last office visit with Dr. Magana.  Taking Lisinopril daily.  BP /88, pulse 88.  Had pt sit for a few minutes.  Re-checked BP.  BP / 80.  Advised pt that a message will be sent to Dr. Magana for review.  SOHAIL Noble LPN

## 2024-01-30 DIAGNOSIS — E66.1 CLASS 3 DRUG-INDUCED OBESITY WITH BODY MASS INDEX (BMI) OF 50.0 TO 59.9 IN ADULT, UNSPECIFIED WHETHER SERIOUS COMORBIDITY PRESENT (MULTI): ICD-10-CM

## 2024-01-30 DIAGNOSIS — J45.909 REACTIVE AIRWAY DISEASE WITHOUT COMPLICATION, UNSPECIFIED ASTHMA SEVERITY, UNSPECIFIED WHETHER PERSISTENT (HHS-HCC): ICD-10-CM

## 2024-01-30 DIAGNOSIS — I83.812 VARICOSE VEINS OF LEFT LOWER EXTREMITY WITH PAIN: ICD-10-CM

## 2024-02-01 ENCOUNTER — OFFICE VISIT (OUTPATIENT)
Dept: ORTHOPEDIC SURGERY | Facility: CLINIC | Age: 54
End: 2024-02-01
Payer: COMMERCIAL

## 2024-02-01 VITALS — HEIGHT: 70 IN | WEIGHT: 315 LBS | BODY MASS INDEX: 45.1 KG/M2

## 2024-02-01 DIAGNOSIS — M17.12 ARTHRITIS OF KNEE, LEFT: Primary | ICD-10-CM

## 2024-02-01 PROCEDURE — 99024 POSTOP FOLLOW-UP VISIT: CPT

## 2024-02-01 PROCEDURE — 20610 DRAIN/INJ JOINT/BURSA W/O US: CPT

## 2024-02-01 PROCEDURE — 3008F BODY MASS INDEX DOCD: CPT

## 2024-02-01 PROCEDURE — 1036F TOBACCO NON-USER: CPT

## 2024-02-01 NOTE — PROGRESS NOTES
Subjective    Patient ID: Neptali Estrada is a 53 y.o. male.    Chief Complaint: Follow-up of the Left Knee     GELSYN INJECTION #1 - LEFT KNEE    L Inj/Asp: L knee on 2/1/2024 9:04 AM  Indications: pain (Arthritis)  Details: 22 G needle, superolateral approach  Medications: 16.8 mg sodium hyaluronate 16.8 mg/2 mL  Procedure, treatment alternatives, risks and benefits explained, specific risks discussed. Consent was given by the patient.         Assessment/Plan   Encounter Diagnoses: left knee arthritis     Plan: Patient will follow-up next week for 2nd injection in a series of 3 Gelsyn injections.

## 2024-02-05 ENCOUNTER — HOSPITAL ENCOUNTER (OUTPATIENT)
Dept: RADIOLOGY | Facility: HOSPITAL | Age: 54
Discharge: HOME | End: 2024-02-05
Payer: COMMERCIAL

## 2024-02-05 DIAGNOSIS — M25.512 ACUTE PAIN OF LEFT SHOULDER: ICD-10-CM

## 2024-02-05 PROCEDURE — 73221 MRI JOINT UPR EXTREM W/O DYE: CPT | Mod: LT

## 2024-02-05 PROCEDURE — 73221 MRI JOINT UPR EXTREM W/O DYE: CPT | Mod: LEFT SIDE | Performed by: RADIOLOGY

## 2024-02-08 ENCOUNTER — OFFICE VISIT (OUTPATIENT)
Dept: ORTHOPEDIC SURGERY | Facility: CLINIC | Age: 54
End: 2024-02-08
Payer: COMMERCIAL

## 2024-02-08 VITALS — BODY MASS INDEX: 45.1 KG/M2 | HEIGHT: 70 IN | WEIGHT: 315 LBS

## 2024-02-08 DIAGNOSIS — M17.12 ARTHRITIS OF KNEE, LEFT: Primary | ICD-10-CM

## 2024-02-08 PROCEDURE — 20610 DRAIN/INJ JOINT/BURSA W/O US: CPT

## 2024-02-08 PROCEDURE — 1036F TOBACCO NON-USER: CPT

## 2024-02-08 PROCEDURE — 3008F BODY MASS INDEX DOCD: CPT

## 2024-02-08 PROCEDURE — 99024 POSTOP FOLLOW-UP VISIT: CPT

## 2024-02-08 NOTE — PROGRESS NOTES
Subjective    Patient ID: Neptali Estrada is a 53 y.o. male.    Chief Complaint: OTHER (GELSYN-3 INJECTION 16.8MG/2ML/#2 LT KNEE./PATIENT SUPPLIED.)     L Inj/Asp: L knee on 2/8/2024 9:41 AM  Indications: pain (Arthritis)  Details: 22 G needle, anterolateral approach  Medications: 16.8 mg sodium hyaluronate 16.8 mg/2 mL  Procedure, treatment alternatives, risks and benefits explained, specific risks discussed. Consent was given by the patient.          Assessment/Plan   Encounter Diagnoses: left knee arthritis    Plan: Patient will continue to monitor benefit of injections and will follow-up next week for final injection in a series of 3.

## 2024-02-13 ENCOUNTER — OFFICE VISIT (OUTPATIENT)
Dept: ORTHOPEDIC SURGERY | Facility: CLINIC | Age: 54
End: 2024-02-13
Payer: COMMERCIAL

## 2024-02-13 VITALS — WEIGHT: 315 LBS | BODY MASS INDEX: 45.1 KG/M2 | HEIGHT: 70 IN

## 2024-02-13 DIAGNOSIS — S46.212A BICEPS RUPTURE, PROXIMAL, LEFT, INITIAL ENCOUNTER: ICD-10-CM

## 2024-02-13 DIAGNOSIS — M75.112 INCOMPLETE ROTATOR CUFF TEAR OR RUPTURE OF LEFT SHOULDER, NOT SPECIFIED AS TRAUMATIC: ICD-10-CM

## 2024-02-13 PROCEDURE — 1036F TOBACCO NON-USER: CPT | Performed by: ORTHOPAEDIC SURGERY

## 2024-02-13 PROCEDURE — 99214 OFFICE O/P EST MOD 30 MIN: CPT | Performed by: ORTHOPAEDIC SURGERY

## 2024-02-13 PROCEDURE — 3008F BODY MASS INDEX DOCD: CPT | Performed by: ORTHOPAEDIC SURGERY

## 2024-02-13 NOTE — PROGRESS NOTES
53-year-old is seen for his left shoulder.  He has been having intermittent moderate throbbing discomfort in the left shoulder.  He has difficulty with overhead reaching and lifting activities.  He has had pain since January 13, 2024.  He was reaching with his arm and he felt a tearing sensation along the anterior aspect of the shoulder.  He had ecchymosis and pain radiating into the bicep.  He has used Tylenol with mild relief.    Pleasant and no acute distress.  Left shoulder forward flexion 160°. No effusion or instability. There is positive Neer and Wise impingement. There is subacromial crepitus and tenderness around the subacromial space.  There is biceps tenderness. There is a positive Plymouth's test. No acromioclavicular tenderness. Rotator cuff strength is intact but there is discomfort with strength testing. Right shoulder forward flexion 180°. No effusion or instability. No impingement or crepitus or tenderness involving the subacromial space. No biceps or acromioclavicular tenderness. There is intact rotator cuff strength. There is adequate range of motion of the cervical spine without pain. Both upper extremities are well perfused skin is intact and muscle tone is adequate. Elbow flexion and extension and wrist flexion and extension strength are intact.    Multiple x-ray views of the left shoulder are personally reviewed and there is no acute bony abnormality.    MRI of the left shoulder is personally reviewed and there is high-grade partial articular sided tearing involving the entire width of the supraspinatus tendon.  There is infraspinatus and subscapularis tendinosis.  The bicep tendon is ruptured and retracted from its proximal attachment.  There is chronic labral tearing.    A detailed discussion about the shoulder and the partial articular sided rotator cuff tear was done.  Treatment options including no treatment reviewed.  Discussion was done about surgical and nonsurgical treatment.  At  this point with this being a partial articular sided tear he could continue with further conservative management.  Will perform physical therapy.  He will use Tylenol.  He will avoid aggravating activities.  If he had persistent symptoms then shoulder arthroscopy with treatment of the rotator cuff as needed with debridement or repair could be done and this was discussed with him.

## 2024-02-15 ENCOUNTER — OFFICE VISIT (OUTPATIENT)
Dept: ORTHOPEDIC SURGERY | Facility: CLINIC | Age: 54
End: 2024-02-15
Payer: COMMERCIAL

## 2024-02-15 VITALS — BODY MASS INDEX: 45.1 KG/M2 | HEIGHT: 70 IN | WEIGHT: 315 LBS

## 2024-02-15 DIAGNOSIS — M17.12 ARTHRITIS OF KNEE, LEFT: ICD-10-CM

## 2024-02-15 DIAGNOSIS — M25.562 CHRONIC PAIN OF LEFT KNEE: Primary | ICD-10-CM

## 2024-02-15 DIAGNOSIS — G89.29 CHRONIC PAIN OF LEFT KNEE: Primary | ICD-10-CM

## 2024-02-15 PROCEDURE — 20610 DRAIN/INJ JOINT/BURSA W/O US: CPT

## 2024-02-15 PROCEDURE — 3008F BODY MASS INDEX DOCD: CPT

## 2024-02-15 PROCEDURE — 1036F TOBACCO NON-USER: CPT

## 2024-02-15 PROCEDURE — 99024 POSTOP FOLLOW-UP VISIT: CPT

## 2024-02-15 NOTE — PROGRESS NOTES
Subjective    Patient ID: Neptali Estrada is a 53 y.o. male.    Chief Complaint: Follow-up of the Left Knee     L Inj/Asp: L knee on 2/15/2024 8:45 AM  Indications: pain (Arthritis)  Details: 22 G needle, superolateral approach  Medications: 16.8 mg sodium hyaluronate 16.8 mg/2 mL  Procedure, treatment alternatives, risks and benefits explained, specific risks discussed. Consent was given by the patient.          Assessment/Plan   Encounter Diagnoses: left knee arthritis     Plan: Patient will continue to monitor benefit of completion of Gelsyn injection series.  He will follow-up as symptoms dictate.

## 2024-02-22 ENCOUNTER — EVALUATION (OUTPATIENT)
Dept: PHYSICAL THERAPY | Facility: CLINIC | Age: 54
End: 2024-02-22
Payer: COMMERCIAL

## 2024-02-22 DIAGNOSIS — M75.112 INCOMPLETE ROTATOR CUFF TEAR OR RUPTURE OF LEFT SHOULDER, NOT SPECIFIED AS TRAUMATIC: ICD-10-CM

## 2024-02-22 DIAGNOSIS — S46.212A BICEPS RUPTURE, PROXIMAL, LEFT, INITIAL ENCOUNTER: ICD-10-CM

## 2024-02-22 PROCEDURE — 97110 THERAPEUTIC EXERCISES: CPT | Mod: GP | Performed by: PHYSICAL THERAPIST

## 2024-02-22 PROCEDURE — 97161 PT EVAL LOW COMPLEX 20 MIN: CPT | Mod: GP | Performed by: PHYSICAL THERAPIST

## 2024-02-22 NOTE — PROGRESS NOTES
Physical Therapy    Physical Therapy Evaluation    Patient Name: Neptali Estrada  MRN: [id number]  Today's Date: 02/22/24  Time Calculation  Start Time: 0945  Stop Time: 1030  Time Calculation (min): 45 min     Insurance:  Visit number: 1   Insurance Type: Payor: [facility] / Plan: [facility] AGED BLIND AND DISABLED / Product Type: *No Product type* /   Authorization or Plan of Care date Range:     Therapy diagnoses:   1. Incomplete rotator cuff tear or rupture of left shoulder, not specified as traumatic  Referral to Physical Therapy    Follow Up In Physical Therapy      2. Biceps rupture, proximal, left, initial encounter  Referral to Physical Therapy    Follow Up In Physical Therapy         General:  Reason for visit: L shoulder rotator cuff tear   Referred by: Terry Lara MD  Next MD appt: none scheduled      Precautions:  none  Fall Risk: Low     Assessment:   Patient presents with L shoulder supraspinatus and biceps tendon tear. Will benefit from skilled PT in order to improve functional use of the UE for ADL's and decrease pain    Impairments: Pain, Active ROM, Passive ROM, Strength, Activity limitations, Flexibility, Joint mobility, and Participation restrictions    Functional Limitations: Reaching, Lifting, Dressing, and Sleeping    Recommended Treatment:  Therapeutic exercise, Manual therapy, Home program instruction and progression, Neuromuscular re-education, Therapeutic activities, Self care and home management, Instruction in activity modification, Electrical stimulation, and Vasopneumatic device + cold      Plan:  Plan of care was developed with input and agreement by the patient.  1-2x per week for 4-6 visits    Rehab Potential: Good to achieve goals.    Goals:  -QuickDash= <15 % disability  to indicate a significant improvement in overall function. (MDC 10% points)    -Patient will demonstrate 5/5 rotator cuff strength (all planes) to allow for correct reach/lift mechanics     -Patient will demo  correct posture with min to no cueing to allow for correct loading strategy     -Patient will demo mild to no limitation AROM of the left shoulder to allow for correct mechanics with functional mobility.     -Patient will report reaching overhead without pain to allow for return to work and ADLs without limitation.     -Patient will report driving without pain to allow for return to work and ADLs without limitation.      Subjective:  - CC:  L shoulder rotator cuff tear and biceps tendon rupture. MRI showed full tear of supraspinatus and biceps tear. Wants to try conservative treatment first. Needs both knees replaced as well. Is R handed  - DANIEL:  was showering and heard biceps pop  - DOI/onset: jan 13th  - PAIN - Location: biceps, anterior shoulder, lateral shoulder Worst(past 24 hours): 3/10   - PAIN - Alleviating: tylenol, ice  Aggravating: raising the arm, active use for ADL's  - CURRENT MEDICAL MANAGEMENT: no medications or injection  - PLOF: no prior L shoulder surgeries  - FUNCTIONAL LIMITATIONS: reaching, lifting, ADL's   - LIVING ENVIRONMENT: no barriers  - WORK: on disability   - EXERCISE: none  - Patient stated goal: decrease pain and improve function to avoid surgery    Medical History Form: Reviewed (scanned into chart)       Objective:   - POSTURE: shoulder rounded forward and head forward    - PALPATION: unremarkable     - SHOULDER ROM:   Shoulder ROM: Left shoulder AROM: Flexion: 90  External Rotation in 0 Abd: 40  Abduction: 95  IR (up the back): L5, Left shoulder PROM: Flexion: 120  External rotation in 45 Abd: 50  Abduction: 120  Internal rotation in 45 Abd: 30    - MUSCLE STRENGTH:  Shoulder Flexion R: 5  Shoulder Abduction R: 5  Shoulder External Rotation R: 5  Shoulder Internal Rotation R: 5  Shoulder Flexion L: 4  Shoulder Abduction L: 4  Shoulder External Rotation L: 4  Shoulder Internal Rotation L: 4    - SPECIAL TESTS: none performed     - Elbow WNL  - Cervical WFL      Outcome  "Measures:  Other Measures  Other Outcome Measures: quick dash 50.00     Treatment Performed: (\"NP\" = Not Performed)     HEP: Access Code: [email or web address]    Therapeutic Exercise:     15 minutes  Access Code: [email or web address]  URL: [email or web address]  Date: 02/22/2024  Prepared by: Tao Rios    Exercises  - Seated Scapular Retraction  - 1 x daily - 7 x weekly - 2 sets - 10 reps  - Seated Shoulder Shrug Circles AROM Backward  - 1 x daily - 7 x weekly - 2 sets - 10 reps  - Seated Shoulder Flexion AAROM with Pulley Behind  - 1 x daily - 7 x weekly - 2 sets - 10 reps - 5 hold  - Supine Shoulder Flexion Extension AAROM with Dowel  - 1 x daily - 7 x weekly - 2 sets - 10 reps - 5 hold  - Supine Shoulder External Rotation with Dowel  - 1 x daily - 7 x weekly - 2 sets - 10 reps - 5 hold  - Standing Shoulder Abduction AAROM with Dowel  - 1 x daily - 7 x weekly - 2 sets - 10 reps - 5 hold  - Standing Shoulder Extension with Dowel  - 1 x daily - 7 x weekly - 2 sets - 10 reps  - Shoulder Flexion Wall Slide with Towel  - 1 x daily - 7 x weekly - 2 sets - 10 reps - 5 hold  - Standing Isometric Shoulder Flexion with Doorway - Arm Bent  - 1 x daily - 7 x weekly - 2 sets - 10 reps - 5 hold  - Standing Isometric Shoulder Abduction with Doorway - Arm Bent  - 1 x daily - 7 x weekly - 2 sets - 10 reps - 5 hold  - Standing Isometric Shoulder External Rotation with Doorway and Towel Roll  - 1 x daily - 7 x weekly - 2 sets - 10 reps - 5 hold  - Standing Isometric Shoulder Internal Rotation with Towel Roll at Doorway  - 1 x daily - 7 x weekly - 2 sets - 10 reps - 5 hold    Manual Therapy:       minutes      Neuromuscular Re-education:      minutes      Gait Training:            minutes      Therapeutic Activity:      minutes      Modalities:       Vasopneumatic Device       minutes  Electrical Stimulation          minutes  Ultrasound            minutes  Iontophoresis                     minutes  Cold Pack            " minutes  Mechanical Traction           minutes    Evaluation Complexity: Low: 30 minutes; Moderate   minutes; Complex PT Evaluation Time Entry: 30;  minutes    Re-Evaluation:   minutes

## 2024-03-01 ENCOUNTER — TREATMENT (OUTPATIENT)
Dept: PHYSICAL THERAPY | Facility: CLINIC | Age: 54
End: 2024-03-01
Payer: COMMERCIAL

## 2024-03-01 DIAGNOSIS — S46.212A BICEPS RUPTURE, PROXIMAL, LEFT, INITIAL ENCOUNTER: ICD-10-CM

## 2024-03-01 DIAGNOSIS — M75.112 INCOMPLETE ROTATOR CUFF TEAR OR RUPTURE OF LEFT SHOULDER, NOT SPECIFIED AS TRAUMATIC: ICD-10-CM

## 2024-03-01 PROCEDURE — 97110 THERAPEUTIC EXERCISES: CPT | Mod: GP | Performed by: PHYSICAL THERAPIST

## 2024-03-01 NOTE — PROGRESS NOTES
PHYSICAL THERAPY TREATMENT NOTE    Patient Name:  Neptali Estrada   Patient MRN: [id number]  Date: 03/01/24  Time Calculation  Start Time: 0905  Stop Time: 0945  Time Calculation (min): 40 min    Insurance:  Visit number: 2 of 12  Insurance Type: Payor: [facility] / Plan: [facility] AGED BLIND AND DISABLED / Product Type: *No Product type* /   Authorization or Plan of Care date Range: 2/22/24 to 4/5/24    Therapy diagnoses:   1. Incomplete rotator cuff tear or rupture of left shoulder, not specified as traumatic  Follow Up In Physical Therapy      2. Biceps rupture, proximal, left, initial encounter  Follow Up In Physical Therapy         General:  Reason for visit: L shoulder rotator cuff tear   Referred by: Terry Lara MD  Next MD appt:  none scheduled     Precautions:  none  Fall Risk: Low    Assessment:  Education: Reviewed home exercise program. Provided manual cues for correct performance of exercises. Provided verbal feedback to improve exercise technique.  Progress towards functional goals: Improved reaching ability. Reduced frequency of pain.  Response to interventions: Tolerated treatment session well without any increase in pain. Patient was appropriately fatigued with no complaints.  Justification for continued skilled care: To address remaining functional, objective and subjective deficits to allow them to return to full independence with ADLs. Modify and progress home exercise program. Skilled intervention required to improve ROM which will improve function. Progressive strengthening to stabilize the shoulder to improve function.    Plan:  Exercises targeting surrounding musculature to stabilize the joint and improve function. Exercises to gradually increase flexibility and range of motion of the shoulder. Manual therapy to improve joint mobility.    Subjective:   Neptali reports he  "feels like his condition is improving.  Has been doing HEP and going fairly well   Pain (0-10): 2    HEP adherence / understanding: compliance with the instructed home exercises.      Objective: L shoulder elevation ROM to 110 deg with pain      Treatment Performed: (\"NP\" = Not Performed)     HEP: Access Code: [email or web address]     Therapeutic Exercise:     40 minutes  UBE 3' ea  Pulleys x20 flex/scapt  UT stretch with inferior glide 30s x 3  Isometrics flex/abd/ER/IR 5s x 10 ea  Ball roll up wall 5s x 10  SL ER/ABD 0# 2x10 ea  Reviewed all cane AROM  Performed a few min of manual ranging/stretching in supine    Manual Therapy:       minutes      Neuromuscular Re-education:      minutes      Therapeutic Activity:      minutes      Gait Training:            minutes      Modalities:       Vasopneumatic Device       minutes  Electrical Stimulation          minutes  Ultrasound            minutes  Iontophoresis                     minutes  Cold Pack            minutes  Mechanical Traction           minutes    Evaluation Complexity: Low:   minutes; Moderate   minutes; Complex   minutes    Re-Evaluation:   minutes           "

## 2024-03-05 ENCOUNTER — TREATMENT (OUTPATIENT)
Dept: PHYSICAL THERAPY | Facility: CLINIC | Age: 54
End: 2024-03-05
Payer: COMMERCIAL

## 2024-03-05 DIAGNOSIS — S46.212A BICEPS RUPTURE, PROXIMAL, LEFT, INITIAL ENCOUNTER: ICD-10-CM

## 2024-03-05 DIAGNOSIS — M75.112 INCOMPLETE ROTATOR CUFF TEAR OR RUPTURE OF LEFT SHOULDER, NOT SPECIFIED AS TRAUMATIC: ICD-10-CM

## 2024-03-05 PROCEDURE — 97110 THERAPEUTIC EXERCISES: CPT | Mod: GP,CQ

## 2024-03-05 NOTE — PROGRESS NOTES
PHYSICAL THERAPY TREATMENT NOTE    Patient Name:  Neptali Estrada   Patient MRN: [id number]  Date: 03/05/24  Time Calculation  Start Time: 0955  Stop Time: 1035  Time Calculation (min): 40 min    Insurance:  Visit number: 3 of 12  Insurance Type: Payor: [facility] / Plan: [facility] AGED BLIND AND DISABLED / Product Type: *No Product type* /   Authorization or Plan of Care date Range: 2/22/24 to 4/5/24    Therapy diagnoses:   1. Incomplete rotator cuff tear or rupture of left shoulder, not specified as traumatic  Follow Up In Physical Therapy      2. Biceps rupture, proximal, left, initial encounter  Follow Up In Physical Therapy         General:  Reason for visit: L shoulder rotator cuff tear   Referred by: Terry Lara MD  Next MD appt:  none scheduled     Precautions:  none  Fall Risk: Low    Assessment:  Education: Reviewed home exercise program. Provided manual cues for correct performance of exercises. Provided verbal feedback to improve exercise technique.  Progress towards functional goals: Improved reaching ability. Reduced frequency of pain.Improved ROM  Response to interventions: Tolerated treatment session well without any increase in pain. Patient was appropriately fatigued with no complaints.  Justification for continued skilled care: To address remaining functional, objective and subjective deficits to allow them to return to full independence with ADLs. Modify and progress home exercise program. Skilled intervention required to improve ROM which will improve function. Progressive strengthening to stabilize the shoulder to improve function.    Plan:  Exercises targeting surrounding musculature to stabilize the joint and improve function. Exercises to gradually increase flexibility and range of motion of the shoulder. Manual therapy to improve joint mobility.    Subjective:   Neptali  "reports he feels like his condition is improving.  Has been doing HEP and going fairly well. Pain comes and goes, currently he is pain free. Does not ice at home, does take Tylenol Arthritis.   Pain (0-10): 0   HEP adherence / understanding: compliance with the instructed home exercises.      Objective: L shoulder abduction AROM 125  flex 120      Treatment Performed: (\"NP\" = Not Performed)     HEP: Access Code: [email or web address]     Therapeutic Exercise:     40 minutes  UBE 3' ea  Pulleys x20 flex/scapt  UT stretch with inferior glide 30s x 3  Towel slide BUE's for flexion 2 x 10   Isometrics flex/abd/ER/IR 5s x 10 ea  Ball roll up wall 5s x 10  np   SL ER/ABD 0# 2x10 ea  Seated /supine cane AAROM 10x   Supine ABC's 1x ABC's 1# 1x   Performed a few min of manual ranging/stretching in supine    Manual Therapy:       minutes      Education: exercise progression    HEP Update : supine ABC's        "

## 2024-03-12 ENCOUNTER — DOCUMENTATION (OUTPATIENT)
Dept: PHYSICAL THERAPY | Facility: CLINIC | Age: 54
End: 2024-03-12
Payer: COMMERCIAL

## 2024-03-12 ENCOUNTER — APPOINTMENT (OUTPATIENT)
Dept: PHYSICAL THERAPY | Facility: CLINIC | Age: 54
End: 2024-03-12
Payer: COMMERCIAL

## 2024-03-12 NOTE — PROGRESS NOTES
Physical Therapy                 Therapy Communication Note    Patient Name: Neptali Estrada  MRN: [id number]  Today's Date: 3/12/2024     Discipline: Physical Therapy    Missed Visit Reason:  has another appt to go to    Missed Time: Cancel    Comment:

## 2024-03-15 ENCOUNTER — TREATMENT (OUTPATIENT)
Dept: PHYSICAL THERAPY | Facility: CLINIC | Age: 54
End: 2024-03-15
Payer: COMMERCIAL

## 2024-03-15 DIAGNOSIS — M75.112 INCOMPLETE ROTATOR CUFF TEAR OR RUPTURE OF LEFT SHOULDER, NOT SPECIFIED AS TRAUMATIC: Primary | ICD-10-CM

## 2024-03-15 DIAGNOSIS — S46.212A BICEPS RUPTURE, PROXIMAL, LEFT, INITIAL ENCOUNTER: ICD-10-CM

## 2024-03-15 PROCEDURE — 97110 THERAPEUTIC EXERCISES: CPT | Mod: GP | Performed by: PHYSICAL THERAPIST

## 2024-03-15 NOTE — PROGRESS NOTES
"                                                                                                                         PHYSICAL THERAPY TREATMENT NOTE    Patient Name:  Neptali Estrada   Patient MRN: [id number]  Date: 03/15/24  Time Calculation  Start Time: 1000  Stop Time: 1040  Time Calculation (min): 40 min    Insurance:  Visit number: 4 of 12  Insurance Type: Payor: [facility] / Plan: [facility] AGED BLIND AND DISABLED / Product Type: *No Product type* /   Authorization or Plan of Care date Range: 2/22/24 to 4/5/24    Therapy diagnoses:   No diagnosis found.     General:  Reason for visit: L shoulder rotator cuff tear   Referred by: MD Lizzy Iverson MD appt:  none scheduled     Precautions:  none  Fall Risk: Low    Assessment:  Education: Reviewed home exercise program. Provided verbal feedback to improve exercise technique.  Progress towards functional goals: Patient reports there has not been a significant change in functional abilities.  Response to interventions: Patient tolerated therapeutic interventions well and without any adverse events. Patient was appropriately fatigued with no complaints.  Justification for continued skilled care: To address remaining functional, objective and subjective deficits to allow them to return to full independence with ADLs. Skilled intervention required to improve ROM which will improve function. Progressive strengthening to stabilize the shoulder to improve function.    Plan:  Exercises targeting surrounding musculature to stabilize the joint and improve function. Manual therapy to improve joint mobility. Exercises to gradually increase flexibility and range of motion of the shoulder.    Subjective:   Neptali reports he feels like his condition is neither improving nor worsening.     Pain (0-10): 4    HEP adherence / understanding: compliance with the instructed home exercises.    Objective: L shoulder abduction AROM 125  flex 120      Treatment Performed: (\"NP\" = Not " Performed)     HEP: Access Code: [email or web address]     Therapeutic Exercise:     40 minutes  UBE 3' ea  Pulleys x20 flex/scapt  UT stretch with inferior glide 30s x 3  TB Row/ext B 2x10 ea  TB ER/IR G 2x10 ea  Isometrics flex/abd/ER/IR 5s x 10 ea-NP  Ball roll up wall 5s x 10   SL ER/ABD 0# 2x10 ea  Seated /supine cane AAROM 10x -NP  Supine and SL ABC's 2# 1x  Performed a few min of manual ranging/stretching in supine    Manual Therapy:       minutes      Education: exercise progression    HEP Update : supine ABC's

## 2024-03-19 ENCOUNTER — TREATMENT (OUTPATIENT)
Dept: PHYSICAL THERAPY | Facility: CLINIC | Age: 54
End: 2024-03-19
Payer: COMMERCIAL

## 2024-03-19 DIAGNOSIS — S46.212A BICEPS RUPTURE, PROXIMAL, LEFT, INITIAL ENCOUNTER: ICD-10-CM

## 2024-03-19 DIAGNOSIS — M75.112 INCOMPLETE ROTATOR CUFF TEAR OR RUPTURE OF LEFT SHOULDER, NOT SPECIFIED AS TRAUMATIC: ICD-10-CM

## 2024-03-19 PROCEDURE — 97110 THERAPEUTIC EXERCISES: CPT | Mod: GP,CQ

## 2024-03-19 NOTE — PROGRESS NOTES
PHYSICAL THERAPY TREATMENT NOTE    Patient Name:  Neptali Estrada   Patient MRN: [id number]  Date: 03/19/24  Time Calculation  Start Time: 0905  Stop Time: 0945  Time Calculation (min): 40 min    Insurance:  Visit number: 5 of 12  Insurance Type: Payor: [facility] / Plan: [facility] AGED BLIND AND DISABLED / Product Type: *No Product type* /   Authorization or Plan of Care date Range: 2/22/24 to 4/5/24    Therapy diagnoses:   1. Incomplete rotator cuff tear or rupture of left shoulder, not specified as traumatic  Follow Up In Physical Therapy      2. Biceps rupture, proximal, left, initial encounter  Follow Up In Physical Therapy           General:  Reason for visit: L shoulder rotator cuff tear   Referred by: Terry Lara MD  Next MD appt:  none scheduled     Precautions:  none  Fall Risk: Low    Assessment:  Education: Updated HEP.  Progress towards functional goals: Patient reports there has not been a significant change in functional abilities. Does feel like as his pain is decreasing may be able to do more. Increased flexion  today.  Response to interventions: Patient tolerated therapeutic interventions well and without any adverse events. Patient was appropriately fatigued with no complaints.  Justification for continued skilled care: To address remaining functional, objective and subjective deficits to allow them to return to full independence with ADLs. Skilled intervention required to improve ROM which will improve function. Progressive strengthening to stabilize the shoulder to improve function.    Plan:  Exercises targeting surrounding musculature to stabilize the joint and improve function. Manual therapy to improve joint mobility. Exercises to gradually increase flexibility and range of motion of the shoulder.    Subjective:   Neptali reports he feels like his condition is improving,  "there is no pain in the bicep , only in the shoulder.   Pain (0-10): 3   HEP adherence / understanding: compliance with the instructed home exercises.    Objective: L shoulder abduction AROM 125  flex 130      Treatment Performed: (\"NP\" = Not Performed)     HEP: Access Code: [email or web address]     Therapeutic Exercise:     40 minutes  UBE 3' ea  Pulleys x20 flex/scapt  UT stretch with inferior glide 30s x 3  TB Row/ext B 2x10 ea  TB ER/IR G 2x10 ea  GTB abd ( small range ) 2 x 10  Isometrics flex/abd/ER/IR 5s x 10 ea-NP  Ball roll up wall 5s 2 x 10   SL ER/ABD 1# 2x10 ea  Seated /supine cane AAROM 10x -NP  Supine and SL ABC's 2# 2x        Supine Shoulder flex 2# 2 x 10  Performed a few min of manual ranging/stretching in supine    Manual Therapy:       minutes      Education: exercise progression    HEP Update : shoulder tband abduction       "

## 2024-03-26 ENCOUNTER — APPOINTMENT (OUTPATIENT)
Dept: PHYSICAL THERAPY | Facility: CLINIC | Age: 54
End: 2024-03-26
Payer: COMMERCIAL

## 2024-03-26 ENCOUNTER — DOCUMENTATION (OUTPATIENT)
Dept: PHYSICAL THERAPY | Facility: CLINIC | Age: 54
End: 2024-03-26

## 2024-03-26 ENCOUNTER — LAB (OUTPATIENT)
Dept: LAB | Facility: LAB | Age: 54
End: 2024-03-26
Payer: COMMERCIAL

## 2024-03-26 DIAGNOSIS — Z11.59 ENCOUNTER FOR SCREENING FOR OTHER VIRAL DISEASES: ICD-10-CM

## 2024-03-26 DIAGNOSIS — E78.5 DYSLIPIDEMIA: ICD-10-CM

## 2024-03-26 LAB
ALBUMIN SERPL BCP-MCNC: 4.4 G/DL (ref 3.4–5)
ALP SERPL-CCNC: 62 U/L (ref 33–120)
ALT SERPL W P-5'-P-CCNC: 19 U/L (ref 10–52)
ANION GAP SERPL CALC-SCNC: 16 MMOL/L (ref 10–20)
AST SERPL W P-5'-P-CCNC: 15 U/L (ref 9–39)
BILIRUB SERPL-MCNC: 0.6 MG/DL (ref 0–1.2)
BUN SERPL-MCNC: 19 MG/DL (ref 6–23)
CALCIUM SERPL-MCNC: 9.7 MG/DL (ref 8.6–10.3)
CHLORIDE SERPL-SCNC: 104 MMOL/L (ref 98–107)
CHOLEST SERPL-MCNC: 152 MG/DL (ref 0–199)
CHOLESTEROL/HDL RATIO: 2.9
CO2 SERPL-SCNC: 27 MMOL/L (ref 21–32)
CREAT SERPL-MCNC: 1.11 MG/DL (ref 0.5–1.3)
EGFRCR SERPLBLD CKD-EPI 2021: 79 ML/MIN/1.73M*2
GLUCOSE SERPL-MCNC: 93 MG/DL (ref 74–99)
HCV AB SER QL: NONREACTIVE
HDLC SERPL-MCNC: 53 MG/DL
LDLC SERPL CALC-MCNC: 64 MG/DL
NON HDL CHOLESTEROL: 99 MG/DL (ref 0–149)
POTASSIUM SERPL-SCNC: 4.9 MMOL/L (ref 3.5–5.3)
PROT SERPL-MCNC: 7.2 G/DL (ref 6.4–8.2)
SODIUM SERPL-SCNC: 142 MMOL/L (ref 136–145)
TRIGL SERPL-MCNC: 177 MG/DL (ref 0–149)
VLDL: 35 MG/DL (ref 0–40)

## 2024-03-26 PROCEDURE — 80053 COMPREHEN METABOLIC PANEL: CPT

## 2024-03-26 PROCEDURE — 80061 LIPID PANEL: CPT

## 2024-03-26 PROCEDURE — 36415 COLL VENOUS BLD VENIPUNCTURE: CPT

## 2024-03-26 PROCEDURE — 86803 HEPATITIS C AB TEST: CPT

## 2024-03-26 NOTE — PROGRESS NOTES
Physical Therapy                 Therapy Communication Note    Patient Name: Neptali Estrada  MRN: [id number]  Today's Date: 3/26/2024     Discipline: Physical Therapy    Missed Visit Reason:  sick    Missed Time: Cancel    Comment:

## 2024-04-01 ENCOUNTER — TREATMENT (OUTPATIENT)
Dept: PHYSICAL THERAPY | Facility: CLINIC | Age: 54
End: 2024-04-01
Payer: COMMERCIAL

## 2024-04-01 DIAGNOSIS — M75.112 INCOMPLETE ROTATOR CUFF TEAR OR RUPTURE OF LEFT SHOULDER, NOT SPECIFIED AS TRAUMATIC: ICD-10-CM

## 2024-04-01 DIAGNOSIS — S46.212A BICEPS RUPTURE, PROXIMAL, LEFT, INITIAL ENCOUNTER: ICD-10-CM

## 2024-04-01 PROCEDURE — 97110 THERAPEUTIC EXERCISES: CPT | Mod: GP | Performed by: PHYSICAL THERAPIST

## 2024-04-01 NOTE — PROGRESS NOTES
PHYSICAL THERAPY TREATMENT NOTE    Patient Name:  Neptali Estrada   Patient MRN: [id number]  Date: 04/01/24  Time Calculation  Start Time: 0940  Stop Time: 1020  Time Calculation (min): 40 min    Insurance:  Visit number: 6 of 12  Insurance Type: Payor: [facility] / Plan: [facility] AGED BLIND AND DISABLED / Product Type: *No Product type* /   Authorization or Plan of Care date Range: 2/22/24 to 4/5/24    Therapy diagnoses:   1. Incomplete rotator cuff tear or rupture of left shoulder, not specified as traumatic  Follow Up In Physical Therapy      2. Biceps rupture, proximal, left, initial encounter  Follow Up In Physical Therapy        General:  Reason for visit: L shoulder rotator cuff tear   Referred by: Terry Lara MD  Next MD appt:  none scheduled     Precautions:  none  Fall Risk: Low    Assessment:  Education: Reviewed home exercise program.  Progress towards functional goals: Improved lifting ability. Improved reaching ability. Reduced pain level.  Response to interventions: Patient tolerated therapeutic interventions well and without any adverse events.  Justification for continued skilled care: To address remaining functional, objective and subjective deficits to allow them to return to full independence with ADLs.    Plan:  Discharge from physical therapy.    Subjective:   Neptali reports he feels like his condition is neither improving nor worsening.  Feels improved strength.    Pain (0-10): 5    HEP adherence / understanding: compliance with the instructed home exercises.    Objective:   -QuickDash= <15 % disability  to indicate a significant improvement in overall function. (MDC 10% points)- not met, improved 18.18     -Patient will demonstrate 5/5 rotator cuff strength (all planes) to allow for correct reach/lift mechanics -not met, improved, flex/abd 4-/5, ER 4-/5, IR 4/5     -Patient  "will demo correct posture with min to no cueing to allow for correct loading strategy -MET     -Patient will demo mild to no limitation AROM of the left shoulder to allow for correct mechanics with functional mobility. -met     -Patient will report reaching overhead without pain to allow for return to work and ADLs without limitation. -partially met     -Patient will report driving without pain to allow for return to work and ADLs without limitation.  -MET    Treatment Performed: (\"NP\" = Not Performed)     HEP: Access Code: [email or web address]     Therapeutic Exercise:     40 minutes  UBE 3' ea  Pulleys x20 flex/scapt  UT stretch with inferior glide 30s x 3  TB Row/ext B 2x10 ea  TB ER/IR G 2x10 ea  GTB abd ( small range ) 2 x 10  Isometrics flex/abd/ER/IR 5s x 10 ea-NP  Ball roll up wall 5s 2 x 10   SL ER/ABD 1# 2x10 ea  Seated /supine cane AAROM 10x -NP  Supine and SL ABC's 2# 2x        Supine Shoulder flex 2# 2 x 10  Performed a few min of manual ranging/stretching in supine    Manual Therapy:       minutes      "

## 2024-04-03 ENCOUNTER — OFFICE VISIT (OUTPATIENT)
Dept: PRIMARY CARE | Facility: CLINIC | Age: 54
End: 2024-04-03
Payer: COMMERCIAL

## 2024-04-03 ENCOUNTER — LAB (OUTPATIENT)
Dept: LAB | Facility: LAB | Age: 54
End: 2024-04-03
Payer: COMMERCIAL

## 2024-04-03 VITALS
BODY MASS INDEX: 45.1 KG/M2 | SYSTOLIC BLOOD PRESSURE: 134 MMHG | HEIGHT: 70 IN | HEART RATE: 98 BPM | DIASTOLIC BLOOD PRESSURE: 88 MMHG | OXYGEN SATURATION: 99 % | WEIGHT: 315 LBS | TEMPERATURE: 98.1 F

## 2024-04-03 DIAGNOSIS — E66.2 CLASS 3 OBESITY WITH ALVEOLAR HYPOVENTILATION, SERIOUS COMORBIDITY, AND BODY MASS INDEX (BMI) OF 60.0 TO 69.9 IN ADULT (MULTI): ICD-10-CM

## 2024-04-03 DIAGNOSIS — M54.50 CHRONIC LOW BACK PAIN WITHOUT SCIATICA, UNSPECIFIED BACK PAIN LATERALITY: ICD-10-CM

## 2024-04-03 DIAGNOSIS — E78.5 DYSLIPIDEMIA: ICD-10-CM

## 2024-04-03 DIAGNOSIS — G89.29 CHRONIC LOW BACK PAIN WITHOUT SCIATICA, UNSPECIFIED BACK PAIN LATERALITY: ICD-10-CM

## 2024-04-03 DIAGNOSIS — I10 ESSENTIAL HYPERTENSION: ICD-10-CM

## 2024-04-03 DIAGNOSIS — J01.00 ACUTE NON-RECURRENT MAXILLARY SINUSITIS: ICD-10-CM

## 2024-04-03 DIAGNOSIS — E66.2 CLASS 3 OBESITY WITH ALVEOLAR HYPOVENTILATION, SERIOUS COMORBIDITY, AND BODY MASS INDEX (BMI) OF 60.0 TO 69.9 IN ADULT (MULTI): Primary | ICD-10-CM

## 2024-04-03 DIAGNOSIS — J45.909 REACTIVE AIRWAY DISEASE WITHOUT COMPLICATION, UNSPECIFIED ASTHMA SEVERITY, UNSPECIFIED WHETHER PERSISTENT (HHS-HCC): ICD-10-CM

## 2024-04-03 DIAGNOSIS — J45.909 ASTHMA, UNSPECIFIED ASTHMA SEVERITY, UNSPECIFIED WHETHER COMPLICATED, UNSPECIFIED WHETHER PERSISTENT (HHS-HCC): ICD-10-CM

## 2024-04-03 DIAGNOSIS — M25.59 PAIN IN OTHER JOINT: ICD-10-CM

## 2024-04-03 LAB
EST. AVERAGE GLUCOSE BLD GHB EST-MCNC: 94 MG/DL
HBA1C MFR BLD: 4.9 %
INSULIN P FAST SERPL-ACNC: 23 UIU/ML (ref 3–25)

## 2024-04-03 PROCEDURE — 99214 OFFICE O/P EST MOD 30 MIN: CPT | Performed by: INTERNAL MEDICINE

## 2024-04-03 PROCEDURE — 36415 COLL VENOUS BLD VENIPUNCTURE: CPT

## 2024-04-03 PROCEDURE — 83036 HEMOGLOBIN GLYCOSYLATED A1C: CPT

## 2024-04-03 PROCEDURE — 3075F SYST BP GE 130 - 139MM HG: CPT | Performed by: INTERNAL MEDICINE

## 2024-04-03 PROCEDURE — 83525 ASSAY OF INSULIN: CPT

## 2024-04-03 PROCEDURE — 3079F DIAST BP 80-89 MM HG: CPT | Performed by: INTERNAL MEDICINE

## 2024-04-03 PROCEDURE — 3008F BODY MASS INDEX DOCD: CPT | Performed by: INTERNAL MEDICINE

## 2024-04-03 RX ORDER — FLUTICASONE PROPIONATE 50 MCG
2 SPRAY, SUSPENSION (ML) NASAL
Qty: 16 G | Refills: 2 | Status: SHIPPED | OUTPATIENT
Start: 2024-04-03

## 2024-04-03 RX ORDER — ETODOLAC 400 MG/1
400 TABLET, FILM COATED ORAL 2 TIMES DAILY
Qty: 60 TABLET | Refills: 2 | Status: SHIPPED | OUTPATIENT
Start: 2024-04-03

## 2024-04-03 RX ORDER — AMOXICILLIN 875 MG/1
875 TABLET, FILM COATED ORAL 2 TIMES DAILY
Qty: 20 TABLET | Refills: 0 | Status: SHIPPED | OUTPATIENT
Start: 2024-04-03 | End: 2024-04-13

## 2024-04-03 RX ORDER — CETIRIZINE HYDROCHLORIDE 10 MG/1
10 TABLET ORAL DAILY
Qty: 30 TABLET | Refills: 2 | Status: SHIPPED | OUTPATIENT
Start: 2024-04-03

## 2024-04-03 RX ORDER — ATORVASTATIN CALCIUM 10 MG/1
10 TABLET, FILM COATED ORAL DAILY
Qty: 30 TABLET | Refills: 2 | Status: SHIPPED | OUTPATIENT
Start: 2024-04-03 | End: 2024-09-30

## 2024-04-03 RX ORDER — LISINOPRIL 20 MG/1
20 TABLET ORAL DAILY
Qty: 30 TABLET | Refills: 2 | Status: SHIPPED | OUTPATIENT
Start: 2024-04-03

## 2024-04-03 RX ORDER — ALBUTEROL SULFATE 90 UG/1
2 AEROSOL, METERED RESPIRATORY (INHALATION) EVERY 4 HOURS PRN
Qty: 18 G | Refills: 2 | Status: CANCELLED | OUTPATIENT
Start: 2024-04-03

## 2024-04-03 RX ORDER — MONTELUKAST SODIUM 10 MG/1
10 TABLET ORAL NIGHTLY
Qty: 30 TABLET | Refills: 2 | Status: SHIPPED | OUTPATIENT
Start: 2024-04-03

## 2024-04-03 ASSESSMENT — ENCOUNTER SYMPTOMS
LOSS OF SENSATION IN FEET: 0
DEPRESSION: 0
OCCASIONAL FEELINGS OF UNSTEADINESS: 1

## 2024-04-03 NOTE — PROGRESS NOTES
"Subjective   Patient ID: Neptali Estrada is a 53 y.o. male who presents for Follow-up (EP.  Follow up.  Labs done.  Possible sinus infection runny nose, drainage, sore throat, yellow mucus.).  HPI  Has had sinus sxs x 2 weeks    Stuffy runny nose post nasal drip and cough, gets stuffy over night and colored drainage in the am , past weekend   was tired in bed not feeling well  Sees podiatry   Has numbness left foot numb on bottom and electric shock in foot   On neurvive and  better , no shooting pains, still numb, he thinks maybe related to back and  perhaps  a pinched nerve   No current  back ain but had before  and has not seen PT  Would like to get a scooter for insurance  Has been on disability due to degenerative disc   Gets  synvisc every 6 month steroid  every 6    Has been trying to lose weight to get down and be eligible  for tka   Had  shoulder injury and had PT and had to heal not exercising and  gained weight   No cp ha dizziness and sob    Slight swelling in legs   Bmi more than 60  Has not considered weight loss meds   Review of Systems  Review of systems was performed and is otherwise negative except as noted in HPI.  Objective   Pulse 98   Temp 36.7 °C (98.1 °F) (Oral)   Ht 1.778 m (5' 10\")   Wt (!) 192 kg (423 lb)   SpO2 99%   BMI 60.69 kg/m²    Physical Exam  HEENT is normal  Lungs clear bilaterally  Heart is regular rate rhythm no murmurs  Abdomen benign  Lower extremities no edema    Assessment/Plan   Diagnoses and all orders for this visit:  Class 3 obesity with alveolar hypoventilation, serious comorbidity, and body mass index (BMI) of 60.0 to 69.9 in adult (CMS/[facility])  -     Follow Up In [facility] - Pharmacy; Future  -     Hemoglobin A1C; Future  -     Insulin, Fasting; Future  Asthma, unspecified asthma severity, unspecified whether complicated, unspecified whether persistent  -     montelukast (Singulair) 10 mg tablet; Take 1 tablet (10 mg) by mouth once daily at bedtime.  -    "  fluticasone (Flonase) 50 mcg/actuation nasal spray; Administer 2 sprays into each nostril once daily.  -     cetirizine (ZyrTEC) 10 mg tablet; Take 1 tablet (10 mg) by mouth once daily.  Essential hypertension  -     lisinopril 20 mg tablet; Take 1 tablet (20 mg) by mouth once daily.  -     Follow Up In [facility] - PCP - Established; Future  Pain in other joint  -     etodolac (Lodine) 400 mg tablet; Take 1 tablet (400 mg) by mouth 2 times a day.  Dyslipidemia  -     atorvastatin (Lipitor) 10 mg tablet; Take 1 tablet (10 mg) by mouth once daily.  -     Follow Up In [facility] - PCP - Established; Future  Reactive airway disease without complication, unspecified asthma severity, unspecified whether persistent  Chronic low back pain without sciatica, unspecified back pain laterality  -     XR lumbar spine 2-3 views; Future  Acute non-recurrent maxillary sinusitis  -     amoxicillin (Amoxil) 875 mg tablet; Take 1 tablet (875 mg) by mouth 2 times a day for 10 days.    Xray if neg PT, if not MRI, can try EMG of need be   Treat sinuses  Call if issues fail to resolve  Will start with back x-ray May consider PT  Reviewed regular blood work  [facility] referral for pharmacy for weight loss follow-up with me in 3 months    Lynne Magana MD

## 2024-04-05 ENCOUNTER — HOSPITAL ENCOUNTER (OUTPATIENT)
Dept: RADIOLOGY | Facility: HOSPITAL | Age: 54
Discharge: HOME | End: 2024-04-05
Payer: COMMERCIAL

## 2024-04-05 DIAGNOSIS — G89.29 CHRONIC LOW BACK PAIN WITHOUT SCIATICA, UNSPECIFIED BACK PAIN LATERALITY: ICD-10-CM

## 2024-04-05 DIAGNOSIS — M54.50 CHRONIC LOW BACK PAIN WITHOUT SCIATICA, UNSPECIFIED BACK PAIN LATERALITY: ICD-10-CM

## 2024-04-05 PROCEDURE — 72100 X-RAY EXAM L-S SPINE 2/3 VWS: CPT

## 2024-04-05 PROCEDURE — 72100 X-RAY EXAM L-S SPINE 2/3 VWS: CPT | Performed by: RADIOLOGY

## 2024-04-08 DIAGNOSIS — G89.29 CHRONIC LOW BACK PAIN WITHOUT SCIATICA, UNSPECIFIED BACK PAIN LATERALITY: Primary | ICD-10-CM

## 2024-04-08 DIAGNOSIS — M54.50 CHRONIC LOW BACK PAIN WITHOUT SCIATICA, UNSPECIFIED BACK PAIN LATERALITY: Primary | ICD-10-CM

## 2024-04-17 ENCOUNTER — TELEMEDICINE (OUTPATIENT)
Dept: PHARMACY | Facility: HOSPITAL | Age: 54
End: 2024-04-17
Payer: COMMERCIAL

## 2024-04-17 DIAGNOSIS — E66.1 CLASS 3 DRUG-INDUCED OBESITY WITH BODY MASS INDEX (BMI) OF 50.0 TO 59.9 IN ADULT, UNSPECIFIED WHETHER SERIOUS COMORBIDITY PRESENT (MULTI): Primary | ICD-10-CM

## 2024-04-17 DIAGNOSIS — E66.2 CLASS 3 OBESITY WITH ALVEOLAR HYPOVENTILATION, SERIOUS COMORBIDITY, AND BODY MASS INDEX (BMI) OF 60.0 TO 69.9 IN ADULT (MULTI): ICD-10-CM

## 2024-04-17 RX ORDER — DULAGLUTIDE 0.75 MG/.5ML
0.75 INJECTION, SOLUTION SUBCUTANEOUS
Qty: 2 ML | Refills: 1 | Status: SHIPPED | OUTPATIENT
Start: 2024-04-21 | End: 2024-05-08 | Stop reason: DRUGHIGH

## 2024-04-17 NOTE — PROGRESS NOTES
Pharmacist Clinic: Weight Management    Neptali Estrada was referred to the Clinical Pharmacy Team for weight management.     Referring Provider: Lynne Magana MD    HISTORY OF PRESENT ILLNESS  Patient was referred to Clinical Pharmacy for weight loss management.      Diet:   -Breakfast: eggs, toast  -Lunch: salad or sandwich with chips  -Dinner: chicken and vegetables, pasta, eats meal around 6pm  -Snacks: peanuts, cashews     Exercise Routine: per patient, unable to get much exercise with pain from knees. Patient states he needs a knee replacement, but needs to lose weight prior to surgery.    Additional Contributory Factors/Comorbidities:  Dyslipidemia, hypertension.    Weight  423 lbs (initial -at PCP appointment)    PHARMACY ASSESSMENT  Pertinent PMH Review:  - PMH of Pancreatitis: No  - PMH of Retinopathy: No  - PMH of MTC: No    Allergies: Patient has no known allergies.     [id number] - [place], [place] - [address]  [address]  [facility] [address]  Phone: [phone] Fax: [phone]    [address] [address]  Phone: [phone] Fax: [phone]      Affordability/Accessibility:   -Trulicity: covered by insurance  -Wegovy: PA  -Zepbound: PA  -Ozempic: PA  -Mounjaro: PA    Adherence/Organization: no issues per patient  Adverse Effects: none     CURRENT PHARMACOTHERAPY  - None     ASCVD PREVENTION  -Statin? Yes, atorvastatin 10mg  -Aspirin? No    MEDICATION RECONCILIATION  No changes to medication record at this time. Patient able to confirm medications and directions.    DRUG INTERACTIONS  - No significant drug-drug interactions exist that require adjustment to therapy    LAB  Lab Results   Component Value Date    BILITOT 0.6 03/26/2024    CALCIUM 9.7 03/26/2024    CO2 27 03/26/2024     03/26/2024    CREATININE 1.11 03/26/2024    GLUCOSE 93 03/26/2024    ALKPHOS 62 03/26/2024    K 4.9 03/26/2024    PROT 7.2 03/26/2024      2024    AST 15 2024    ALT 19 2024    BUN 19 2024    ANIONGAP 16 2024    ALBUMIN 4.4 2024    EGFR 79 2024     Lab Results   Component Value Date    TRIG 177 (H) 2024    CHOL 152 2024    LDLCALC 64 2024    HDL 53.0 2024     Lab Results   Component Value Date    HGBA1C 4.9 2024       Current Outpatient Medications on File Prior to Visit   Medication Sig Dispense Refill    albuterol (Ventolin HFA) 90 mcg/actuation inhaler Inhale 2 puffs every 4 hours if needed for wheezing. 18 g 2    [] amoxicillin (Amoxil) 875 mg tablet Take 1 tablet (875 mg) by mouth 2 times a day for 10 days. 20 tablet 0    atorvastatin (Lipitor) 10 mg tablet Take 1 tablet (10 mg) by mouth once daily. 30 tablet 2    cetirizine (ZyrTEC) 10 mg tablet Take 1 tablet (10 mg) by mouth once daily. 30 tablet 2    etodolac (Lodine) 400 mg tablet Take 1 tablet (400 mg) by mouth 2 times a day. 60 tablet 2    fluticasone (Flonase) 50 mcg/actuation nasal spray Administer 2 sprays into each nostril once daily. 16 g 2    lisinopril 20 mg tablet Take 1 tablet (20 mg) by mouth once daily. 30 tablet 2    montelukast (Singulair) 10 mg tablet Take 1 tablet (10 mg) by mouth once daily at bedtime. 30 tablet 2    sodium hyaluronate (Gelsyn-3) 16.8 mg/2 mL injection Inject 2 mL (16.8 mg) into the joint every 7 days. Inject one 16.8mg/2ml prefilled syringe intra-articularly into the left knee once weekly for three weeks; Qty: 1 series, 3 injections 6 mL 0     No current facility-administered medications on file prior to visit.       PATIENT EDUCATION/GOALS  - Counseled patient on MOA, expectations, side effects, duration of therapy, contraindications, administration, and monitoring parameters  - Answered all patient questions and concerns; provided [facility] phone number if issues/questions arise    Trulicity Education:   - Counseled patient on Trulicity MOA, expectations, side effects,  duration of therapy, administration, and monitoring parameters.  - Provided detailed dosing and administration counseling to ensure proper technique. Patient was able to repeat administration instructions independently to ensure understanding.  - Reviewed Trulicity titration schedule, starting with 0.75 mg once weekly to 1.5 mg, 3 mg, and if tolerated 4.5 mg.  - Counseled patient on the benefits of GLP-1ra, such as cardiovascular risk reduction, glycemic control, and weight loss potential.  - Reviewed storage requirements of Trulicity when not in use, and when to administer the medication if a dose is missed.  - Advised patient that they may experience improved satiety after meals and portion sizes of meals may be reduced as doses of Trulicity increase.   -We have discussed the off-label use of once-weekly Trulicity  which is indicated and approved by the FDA for Type Two Diabetes Mellitus treatment. Medication cost and coverage for this agent may change at any time as determined by your primary insurance carrier.     RECOMMENDATIONS/PLAN  Problem List Items Addressed This Visit       Class 3 obesity with alveolar hypoventilation, serious comorbidity, and body mass index (BMI) of 60.0 to 69.9 in adult (Multi)        Relevant Medications    dulaglutide (Trulicity) 0.75 mg/0.5 mL pen injector (Start on 4/21/2024)    Other Relevant Orders    Follow Up In Advanced Primary Care - Pharmacy     1. Weight Loss Plan: Start Trulicity 0.75 mg subcutaneously once weekly. Will follow up with patient in 3 weeks to assess tolerability of Trulicity 0.75mg dose after 3 injections have been given, prior to 4th injection.    Clinical Pharmacist follow up: 5/8/2024 @ 9:30AM  Type of Encounter: Virtual    Continue all meds under the continuation of care with the referring provider and clinical pharmacy team.    Thank you,    Indira Arndt, Pharm D  PGY-1 Pharmacy Resident, [facility]     Verbal consent to manage patient's drug therapy was  obtained from patient. They were informed they may decline to participate or withdraw from participation in pharmacy services at any time.

## 2024-04-19 NOTE — PROGRESS NOTES
I reviewed the progress note and agree with the resident’s findings and plans as written. Case discussed with resident.    Johnny Billy, PharmD

## 2024-04-24 ENCOUNTER — EVALUATION (OUTPATIENT)
Dept: PHYSICAL THERAPY | Facility: CLINIC | Age: 54
End: 2024-04-24
Payer: COMMERCIAL

## 2024-04-24 DIAGNOSIS — G89.29 CHRONIC LOW BACK PAIN WITHOUT SCIATICA, UNSPECIFIED BACK PAIN LATERALITY: Primary | ICD-10-CM

## 2024-04-24 DIAGNOSIS — M54.50 CHRONIC LOW BACK PAIN WITHOUT SCIATICA, UNSPECIFIED BACK PAIN LATERALITY: Primary | ICD-10-CM

## 2024-04-24 PROCEDURE — 97161 PT EVAL LOW COMPLEX 20 MIN: CPT | Mod: GP | Performed by: PHYSICAL THERAPIST

## 2024-04-24 PROCEDURE — 97110 THERAPEUTIC EXERCISES: CPT | Mod: GP | Performed by: PHYSICAL THERAPIST

## 2024-04-24 NOTE — PROGRESS NOTES
Physical Therapy    Physical Therapy Evaluation    Patient Name: Neptali Estrada  MRN: [id number]  Today's Date: 04/24/24  Time Calculation  Start Time: 0125  Stop Time: 0205  Time Calculation (min): 40 min     Insurance:  Visit number: 1   Insurance Type: Payor: [facility] / [facility] / Product Type: *No Product type* /   Authorization or Plan of Care date Range:     Problem List Items Addressed This Visit             ICD-10-CM    Chronic low back pain without sciatica - Primary M54.50, G89.29       General:  Reason for visit: LBP   Referred by: MD Lizzy Chou MD appt: 8/2/24     Precautions:  none  Fall Risk: Low     Assessment:   Patient presents with s/s of lumbar radiculopathy mainly affecting the L LE. Initially responding with repeated flexion, will continue to assess symptoms.     Impairments: Pain, Activity limitations, Flexibility, Joint mobility, Posture, Decreased functional level, and Participation restrictions    Functional Limitations: Walking, Stair negotiation, and Standing    Recommended Treatment:  Therapeutic exercise, Manual therapy, Home program instruction and progression, Neuromuscular re-education, Therapeutic activities, Self care and home management, Instruction in activity modification, and Electrical stimulation      Plan:  Plan of care was developed with input and agreement by the patient.  1x per week for 6 weeks    Rehab Potential: Good to achieve goals.    Goals:  -Patient to be Indep with [facility] for self management of symptoms    -Abolish LE radiating/radicular symptoms    -Modified Oswestry: 0-19% impairment to indicate a significant improvement in overall function.    -Patient will demonstrate correct posture with min to no cueing to allow for correct loading strategy.    -Patient will demonstrate 5/5 hip strength to allow for correct gait and stair mechanics.     -Patient will demo mild to no limitation AROM of the lumbar spine to allow for  correct mechanics with functional mobility.     -Patient will report standing 60 minutes  without pain to allow for return to work and ADLs without limitation.     -Patient will report sitting 60 minutes without pain to allow for return to work and ADLs without limitation.    -Patient will demonstrate appropriate body mechanics for household and common activities to reduce incidence or future back pain exacerbations     Subjective:  - CC:  presents with L foot numbness and tingling. Told it might be coming from the back. Denies pain in the low back. Also has chronic knee pain. Nothing on the R side  - DANIEL:  no acute mechanism   - DOI/onset: 3-4 months  - PAIN - Location: bottom of L foot and toes Worst(past 24 hours): 0/10, more numbness and tingling   - PAIN - Alleviating: medication  Aggravating: worse at end of the day, standing and walking  - CURRENT MEDICAL MANAGEMENT: new medication has stopped shooting pains, still has numbness  - PLOF: some low back pain, chronic knee pain  - FUNCTIONAL LIMITATIONS: prolonged standing and walking   - LIVING ENVIRONMENT: no barriers  - WORK: not working  - EXERCISE: minimal currently  - Patient stated goal: decrease pain with mobility    Medical History Form: Reviewed (scanned into chart)       Objective:   Denies loss of bowel or bladder function, saddle anesthesia, hx of malignancy and osteoporosis.    -GAIT: Independent. cane Moderately antalgic, Decreased velocity, and Forward flexed  -STAIRS:   step to with cane    -POSTURE:   Sitting: fair  Standing: fair    LE DERMATOMES:  Lower extremity sensation is intact.    -PALPATION: unremarkable    LE MYOTOMES:  Lower extremity myotomes are bilaterally WNL  except for L LE great toe ext/flex and DF/PF    LUMBAR TEST MOVEMENTS IN STANDING: Movement Loss indicated by Major, Moderate, Minimal, or Nil  ROM Lumbar in standing: .  Side Bend R: Nil   Side Bend L: Nil   FIS: Minimal  Rep FIS: Better  EIS: Moderate  Rep EIS: No  "worse    LUMBAR TEST MOVEMENTS IN LYING: Movement Loss indicated by Major, Moderate, Minimal, or Nil   ROM Lumbar in lying: .  LIVAN: Minimal  Rep LIVAN: Better    SPECIAL TESTS:  Special Test Lumbar: .  Straight leg raise R/L: negative / negative  Slump R/L: negative/ negative  Crossed Leg straight leg raise R/L: negative /negative      Outcome Measures:  Other Measures  Oswestry Disablity Index (JOSE RAMON): 18     Treatment Performed: (\"NP\" = Not Performed)     Therapeutic Exercise:  15 minutes  Access Code: [email or web address]  URL: [email or web address]  Date: 04/24/2024  Prepared by: Tao Rios    Exercises  - Seated Lumbar Flexion Stretch  - 1 x daily - 7 x weekly - 10 reps - 10 hold  - Supine Double Knee to Chest  - 1 x daily - 7 x weekly - 10 reps - 10 hold  - Supine Single Knee to Chest Stretch  - 1 x daily - 7 x weekly - 10 reps - 10 hold  - Seated Calf Stretch with Strap  - 1 x daily - 7 x weekly - 3 reps - 30 hold  - Seated Heel Raise  - 1 x daily - 7 x weekly - 2 sets - 10 reps  - Seated Toe Raise  - 1 x daily - 7 x weekly - 2 sets - 10 reps  - Heel Toe Raises with Counter Support  - 1 x daily - 7 x weekly - 2 sets - 10 reps  - Standing Heel Raise with Support  - 1 x daily - 7 x weekly - 2 sets - 10 reps    Manual Therapy:    minutes      Neuromuscular Re-education:  minutes      Gait Training:    minutes      Therapeutic Activity:  minutes      Modalities:  Vasopneumatic Device  minutes  Electrical Stimulation  minutes  Ultrasound  minutes  Iontophoresis  minutes  Cold Pack  minutes    Evaluation Complexity: Low: 25 minutes; Moderate   minutes; Complex PT Evaluation Time Entry: 25;  minutes    Re-Evaluation:   minutes    "

## 2024-04-30 ENCOUNTER — TREATMENT (OUTPATIENT)
Dept: PHYSICAL THERAPY | Facility: CLINIC | Age: 54
End: 2024-04-30
Payer: COMMERCIAL

## 2024-04-30 DIAGNOSIS — M54.50 CHRONIC LOW BACK PAIN WITHOUT SCIATICA, UNSPECIFIED BACK PAIN LATERALITY: ICD-10-CM

## 2024-04-30 DIAGNOSIS — G89.29 CHRONIC LOW BACK PAIN WITHOUT SCIATICA, UNSPECIFIED BACK PAIN LATERALITY: ICD-10-CM

## 2024-04-30 PROCEDURE — 97110 THERAPEUTIC EXERCISES: CPT | Mod: GP | Performed by: PHYSICAL THERAPIST

## 2024-04-30 NOTE — PROGRESS NOTES
"                                                                                                                         PHYSICAL THERAPY TREATMENT NOTE    Patient Name:  Neptali Estrada   Patient MRN: [id number]  Date: 04/30/24  Time Calculation  Start Time: 0100  Stop Time: 0140  Time Calculation (min): 40 min    Insurance:  Visit number: 2 of 6  Insurance Type: Payor: [facility] / Plan: [facility] AGED BLIND AND DISABLED / Product Type: *No Product type* /   Authorization or Plan of Care date Range: exp 6/7/24    Problem List Items Addressed This Visit             ICD-10-CM    Chronic low back pain without sciatica M54.50, G89.29        General:  Reason for visit: LBP   Referred by: MD Lizzy Chou MD appt:  8/2/24     Precautions:  none  Fall Risk: Low    Assessment:  Education: Reviewed home exercise program. Provided manual cues for correct performance of exercises. Provided verbal feedback to improve exercise technique.  Progress towards functional goals: Reduced pain level.  Response to interventions: Patient tolerated therapeutic interventions well and without any adverse events. Improved tolerance to strengthening progressions.  Justification for continued skilled care: To address remaining functional, objective and subjective deficits to allow them to return to full independence with ADLs. Progressive strengthening to stabilize the spine/hip to improve function.    Plan:  Exercises targeting surrounding musculature to stabilize the joint and improve function.    Subjective:   Neptali reports he feels like his condition is improving.  No increased pain with initial HEP, some numbness in the L little toe at times   Pain (0-10): 5    HEP adherence / understanding: compliance with the instructed home exercises.    Objective:  No increased pain with flexion    Treatment Performed: (\"NP\" = Not Performed)     HEP: Access Code: [email or web address]     Therapeutic Exercise: 40 minutes  Seated SB flexion stretch 10s x " 10  DKTC and LTR with SB 5s x 20 ea  TA activation in hooklying 5s x 10  Hooklying hip abd/add 5s x 20 ea G TB  Hooklying marching x20 G TB  1/2 SLR x10 bilateral  Manual LAD to L LE    Manual Therapy:   minutes      Neuromuscular Re-education:  minutes      Therapeutic Activity:  minutes      Gait Training:   minutes      Modalities:  Vasopneumatic Device  minutes  Electrical Stimulation  minutes  Ultrasound  minutes  Iontophoresis  minutes  Cold Pack  minutes    Evaluation Complexity: Low:   minutes; Moderate   minutes; Complex   minutes    Re-Evaluation:   minutes

## 2024-05-08 ENCOUNTER — TELEMEDICINE (OUTPATIENT)
Dept: PHARMACY | Facility: HOSPITAL | Age: 54
End: 2024-05-08
Payer: COMMERCIAL

## 2024-05-08 DIAGNOSIS — E66.2 CLASS 3 OBESITY WITH ALVEOLAR HYPOVENTILATION, SERIOUS COMORBIDITY, AND BODY MASS INDEX (BMI) OF 60.0 TO 69.9 IN ADULT (MULTI): ICD-10-CM

## 2024-05-08 RX ORDER — DULAGLUTIDE 1.5 MG/.5ML
1.5 INJECTION, SOLUTION SUBCUTANEOUS
Qty: 2 ML | Refills: 1 | Status: SHIPPED | OUTPATIENT
Start: 2024-05-08

## 2024-05-08 NOTE — PROGRESS NOTES
Pharmacist Clinic: Weight Management    Neptali Estrada was referred to the Clinical Pharmacy Team for weight management.     Referring Provider: Lynne Magana MD    HISTORY OF PRESENT ILLNESS  Patient was referred to Clinical Pharmacy for weight loss management.     Patient reports doing well on Trulicity 0.75mg weekly, no issues with administration or adverse effects. Patient has given 3 injections thus far, and will give 4th injection on Monday, 5/13. Patient reports he also changed his diet as well when starting Trulicity to lower carbohydrate diet. Notices decrease urge to snack or eat between meals, feels full with smaller meal portions.     Diet:   -Breakfast: eggs, toast  -Lunch: salad or sandwich with chips  -Dinner: chicken and vegetables, pasta, eats meal around 6pm  -Snacks: peanuts, cashews     Exercise Routine: per patient, unable to get much exercise with pain from knees. Patient states he needs a knee replacement, but needs to lose weight prior to surgery.    Additional Contributory Factors/Comorbidities:  Dyslipidemia, hypertension.    Weight   Per patient, home scale does not register over 400 lbs, will give error reading (no home weight prior to PCP visit)  423 lbs (initial -at PCP appointment)  399 lbs -5/8/2024     PHARMACY ASSESSMENT  Pertinent PMH Review:  - PMH of Pancreatitis: No  - PMH of Retinopathy: No  - PMH of MTC: No    Allergies: Patient has no known allergies.     [id number] - [place], [place] - [address]  [address] [facility] [address]  Phone: [phone] Fax: [phone]    [address] [address]  Phone: [phone] Fax: [phone]      Affordability/Accessibility:   -Trulicity: covered by insurance  -Wegovy: PA  -Zepbound: PA  -Ozempic: PA  -Mounjaro: PA    Adherence/Organization: no issues per patient  Adverse Effects: none     CURRENT PHARMACOTHERAPY  - Trulicity 0/75 mg under the skin once  weekly    ASCVD PREVENTION  -Statin? Yes, atorvastatin 10mg  -Aspirin? No    MEDICATION RECONCILIATION  No changes to medication record at this time. Patient able to confirm medications and directions.    DRUG INTERACTIONS  - No significant drug-drug interactions exist that require adjustment to therapy    LAB  Lab Results   Component Value Date    BILITOT 0.6 03/26/2024    CALCIUM 9.7 03/26/2024    CO2 27 03/26/2024     03/26/2024    CREATININE 1.11 03/26/2024    GLUCOSE 93 03/26/2024    ALKPHOS 62 03/26/2024    K 4.9 03/26/2024    PROT 7.2 03/26/2024     03/26/2024    AST 15 03/26/2024    ALT 19 03/26/2024    BUN 19 03/26/2024    ANIONGAP 16 03/26/2024    ALBUMIN 4.4 03/26/2024    EGFR 79 03/26/2024     Lab Results   Component Value Date    TRIG 177 (H) 03/26/2024    CHOL 152 03/26/2024    LDLCALC 64 03/26/2024    HDL 53.0 03/26/2024     Lab Results   Component Value Date    HGBA1C 4.9 04/03/2024       Current Outpatient Medications on File Prior to Visit   Medication Sig Dispense Refill    albuterol (Ventolin HFA) 90 mcg/actuation inhaler Inhale 2 puffs every 4 hours if needed for wheezing. 18 g 2    atorvastatin (Lipitor) 10 mg tablet Take 1 tablet (10 mg) by mouth once daily. 30 tablet 2    cetirizine (ZyrTEC) 10 mg tablet Take 1 tablet (10 mg) by mouth once daily. 30 tablet 2    dulaglutide (Trulicity) 0.75 mg/0.5 mL pen injector Inject 0.75 mg under the skin 1 (one) time per week. 2 mL 1    etodolac (Lodine) 400 mg tablet Take 1 tablet (400 mg) by mouth 2 times a day. 60 tablet 2    fluticasone (Flonase) 50 mcg/actuation nasal spray Administer 2 sprays into each nostril once daily. 16 g 2    lisinopril 20 mg tablet Take 1 tablet (20 mg) by mouth once daily. 30 tablet 2    montelukast (Singulair) 10 mg tablet Take 1 tablet (10 mg) by mouth once daily at bedtime. 30 tablet 2    sodium hyaluronate (Gelsyn-3) 16.8 mg/2 mL injection Inject 2 mL (16.8 mg) into the joint every 7 days. Inject one  16.8mg/2ml prefilled syringe intra-articularly into the left knee once weekly for three weeks; Qty: 1 series, 3 injections 6 mL 0     No current facility-administered medications on file prior to visit.       PATIENT EDUCATION/GOALS  - Counseled patient on MOA, expectations, side effects, duration of therapy, contraindications, administration, and monitoring parameters  - Answered all patient questions and concerns; provided [facility] phone number if issues/questions arise    -We have discussed the off-label use of once-weekly Trulicity  which is indicated and approved by the FDA for Type Two Diabetes Mellitus treatment. Medication cost and coverage for this agent may change at any time as determined by your primary insurance carrier.     RECOMMENDATIONS/PLAN  Problem List Items Addressed This Visit       Class 3 obesity with alveolar hypoventilation, serious comorbidity, and body mass index (BMI) of 60.0 to 69.9 in adult (Multi)        Relevant Medications    dulaglutide (Trulicity) 0.75 mg/0.5 mL pen injector (Start on 4/21/2024)    Other Relevant Orders    Follow Up In Advanced Primary Care - Pharmacy     1. Weight Loss Plan: Due to weight loss and tolerability on Trulicity 0.75 mg, will increase to Trulicity 1.5 mg weekly. Patient to inject 4th dose of 0.75 mg on Monday 5/13, then increase to 1.5 mg dose on Monday, 5/20. Will follow up in 1 month to assess efficacy and tolerability of pharmacotherapy.    Clinical Pharmacist follow up: 6/5/24 @ 10:00 AM  Type of Encounter: Virtual    Continue all meds under the continuation of care with the referring provider and clinical pharmacy team.    Thank you,    Indira Arndt, Pharm D  PGY-1 Pharmacy Resident, [facility]     Verbal consent to manage patient's drug therapy was obtained from patient. They were informed they may decline to participate or withdraw from participation in pharmacy services at any time.

## 2024-05-16 ENCOUNTER — TREATMENT (OUTPATIENT)
Dept: PHYSICAL THERAPY | Facility: CLINIC | Age: 54
End: 2024-05-16
Payer: COMMERCIAL

## 2024-05-16 DIAGNOSIS — G89.29 CHRONIC LOW BACK PAIN WITHOUT SCIATICA, UNSPECIFIED BACK PAIN LATERALITY: ICD-10-CM

## 2024-05-16 DIAGNOSIS — M54.50 CHRONIC LOW BACK PAIN WITHOUT SCIATICA, UNSPECIFIED BACK PAIN LATERALITY: ICD-10-CM

## 2024-05-16 PROCEDURE — 97110 THERAPEUTIC EXERCISES: CPT | Mod: GP | Performed by: PHYSICAL THERAPIST

## 2024-05-16 NOTE — PROGRESS NOTES
"                                                                                                                         PHYSICAL THERAPY TREATMENT NOTE    Patient Name:  Neptali Estrada   Patient MRN: [id number]  Date: 05/16/24  Time Calculation  Start Time: 1030  Stop Time: 1110  Time Calculation (min): 40 min    Insurance:  Visit number: 3 of 6  Insurance Type: Payor: [facility] / Plan: [facility] AGED BLIND AND DISABLED / Product Type: *No Product type* /   Authorization or Plan of Care date Range: exp 6/7/24    Problem List Items Addressed This Visit             ICD-10-CM    Chronic low back pain without sciatica M54.50, G89.29      General:  Reason for visit: LBP   Referred by: MD Lizzy Chou MD appt:  8/2/24     Precautions:  none  Fall Risk: Low    Assessment:  Education: Reviewed home exercise program.  Progress towards functional goals: Patient reports there has not been a significant change in functional abilities.  Response to interventions: Patient tolerated therapeutic interventions well and without any adverse events. Improved tolerance to strengthening progressions.  Justification for continued skilled care: To address remaining functional, objective and subjective deficits to allow them to return to full independence with ADLs. Progressive strengthening to stabilize the spine to improve function.    Plan:  Exercises targeting surrounding musculature to stabilize the joint and improve function. Manual therapy to improve joint mobility.    Subjective:   Neptali reports he feels like his condition is improving.  Some less pain, has lost some weight and making knees feel better   Pain (0-10): 1    HEP adherence / understanding: compliance with the instructed home exercises.    Objective:  No increased pain with flexion    Treatment Performed: (\"NP\" = Not Performed)     HEP: Access Code: [email or web address]     Therapeutic Exercise: 40 minutes  Seated SB flexion stretch 10s x 10  DKTC and LTR with SB 5s x 20 " ea  TA activation in hooklying 5s x 10-NP  Hooklying hip abd/add 5s x 20 ea G TB  Hooklying marching x30 G TB  Clam 2x10 bilateral  SAQ 2x10 bilateral  1/2 SLR x10 bilateral  Manual LAD to L LE    Manual Therapy:   minutes      Neuromuscular Re-education:  minutes      Therapeutic Activity:  minutes      Gait Training:   minutes      Modalities:  Vasopneumatic Device  minutes  Electrical Stimulation  minutes  Ultrasound  minutes  Iontophoresis  minutes  Cold Pack  minutes    Evaluation Complexity: Low:   minutes; Moderate   minutes; Complex   minutes    Re-Evaluation:   minutes

## 2024-05-23 ENCOUNTER — TREATMENT (OUTPATIENT)
Dept: PHYSICAL THERAPY | Facility: CLINIC | Age: 54
End: 2024-05-23
Payer: COMMERCIAL

## 2024-05-23 DIAGNOSIS — M54.50 CHRONIC LOW BACK PAIN WITHOUT SCIATICA, UNSPECIFIED BACK PAIN LATERALITY: ICD-10-CM

## 2024-05-23 DIAGNOSIS — G89.29 CHRONIC LOW BACK PAIN WITHOUT SCIATICA, UNSPECIFIED BACK PAIN LATERALITY: ICD-10-CM

## 2024-05-23 PROCEDURE — 97110 THERAPEUTIC EXERCISES: CPT | Mod: GP | Performed by: PHYSICAL THERAPIST

## 2024-05-23 NOTE — PROGRESS NOTES
"                                                                                                                         PHYSICAL THERAPY TREATMENT NOTE    Patient Name:  Neptali Estrada   Patient MRN: [id number]  Date: 05/23/24  Time Calculation  Start Time: 1030  Stop Time: 1110  Time Calculation (min): 40 min    Insurance:  Visit number: 4 of 6  Insurance Type: Payor: [facility] / Plan: [facility] AGED BLIND AND DISABLED / Product Type: *No Product type* /   Authorization or Plan of Care date Range: exp 6/7/24    Problem List Items Addressed This Visit             ICD-10-CM    Chronic low back pain without sciatica M54.50, G89.29     General:  Reason for visit: LBP   Referred by: MD Lizzy Chou MD appt:  8/2/24     Precautions:  none  Fall Risk: Low    Assessment:  Education: Reviewed home exercise program. Provided verbal feedback to improve exercise technique.  Progress towards functional goals: Reduced frequency of pain. Reduced intensity of pain.  Response to interventions: Tolerated treatment session well without any increase in pain. Improved tolerance to strengthening progressions.  Justification for continued skilled care: To address remaining functional, objective and subjective deficits to allow them to return to full independence with ADLs. Progressive strengthening to stabilize the spine to improve function.    Plan:  Exercises targeting surrounding musculature to stabilize the joint and improve function.    Subjective:   Neptali reports he feels like his condition is improving.  Some pain in the arch of the foot   Pain (0-10): 3    HEP adherence / understanding: compliance with the instructed home exercises.      Objective:  No increased pain with flexion    Treatment Performed: (\"NP\" = Not Performed)     HEP: Access Code: [email or web address]     Therapeutic Exercise: 40 minutes  Seated SB flexion stretch 10s x 10  DKTC and LTR with SB 5s x 20 ea  TA activation in hooklying 5s x 10-NP  Hooklying hip abd/add " 5s x 20 ea G TB  Hooklying marching x30 G TB  Clam 2x10 bilateral G TB  SAQ 2x10 bilateral-NP  1/2 SLR x10 bilateral-NP  Bridge 5s x 20  TB row/ext G 2x10 ea  TB paloff press G x20 bilateral  Manual LAD to L LE    Manual Therapy:   minutes      Neuromuscular Re-education:  minutes      Therapeutic Activity:  minutes      Gait Training:   minutes      Modalities:  Vasopneumatic Device  minutes  Electrical Stimulation  minutes  Ultrasound  minutes  Iontophoresis  minutes  Cold Pack  minutes    Evaluation Complexity: Low:   minutes; Moderate   minutes; Complex   minutes    Re-Evaluation:   minutes

## 2024-05-30 ENCOUNTER — TREATMENT (OUTPATIENT)
Dept: PHYSICAL THERAPY | Facility: CLINIC | Age: 54
End: 2024-05-30
Payer: COMMERCIAL

## 2024-05-30 DIAGNOSIS — G89.29 CHRONIC LOW BACK PAIN WITHOUT SCIATICA, UNSPECIFIED BACK PAIN LATERALITY: ICD-10-CM

## 2024-05-30 DIAGNOSIS — M54.50 CHRONIC LOW BACK PAIN WITHOUT SCIATICA, UNSPECIFIED BACK PAIN LATERALITY: ICD-10-CM

## 2024-05-30 PROCEDURE — 97110 THERAPEUTIC EXERCISES: CPT | Mod: GP | Performed by: PHYSICAL THERAPIST

## 2024-05-30 NOTE — PROGRESS NOTES
"                                                                                                                         PHYSICAL THERAPY TREATMENT NOTE    Patient Name:  Neptali Estrada   Patient MRN: [id number]  Date: 05/30/24  Time Calculation  Start Time: 0940  Stop Time: 1020  Time Calculation (min): 40 min    Insurance:  Visit number: 5 of 6  Insurance Type: Payor: [facility] / Plan: [facility] AGED BLIND AND DISABLED / Product Type: *No Product type* /   Authorization or Plan of Care date Range: exp 6/7/24    Problem List Items Addressed This Visit             ICD-10-CM    Chronic low back pain without sciatica M54.50, G89.29       General:  Reason for visit: LBP   Referred by: MD Lizzy Chou MD appt:  8/2/24     Precautions:  none  Fall Risk: Low    Assessment:  Education: Reviewed home exercise program. Provided verbal feedback to improve exercise technique.  Progress towards functional goals: Reduced frequency of pain. Reduced intensity of pain.  Response to interventions: Tolerated treatment session well without any increase in pain. Improved tolerance to strengthening progressions.  Justification for continued skilled care: To address remaining functional, objective and subjective deficits to allow them to return to full independence with ADLs. Progressive strengthening to stabilize the spine to improve function.    Plan:  Exercises targeting surrounding musculature to stabilize the joint and improve function.    Subjective:   Neptali reports he feels like his condition is improving. Decreased pain in the foot, still some numbness    Pain (0-10): 3    HEP adherence / understanding: compliance with the instructed home exercises.    Objective:  No increased pain with flexion    Treatment Performed: (\"NP\" = Not Performed)     HEP: Access Code: [email or web address]     Therapeutic Exercise: 40 minutes  Seated SB flexion stretch 10s x 10  DKTC and LTR with SB 5s x 20 ea  TA activation in hooklying 5s x " 10-NP  Hooklying hip abd/add 5s x 20 ea G TB  Hooklying marching x30 G TB  Clam 2x10 bilateral G TB  SAQ 2x10 bilateral-NP  1/2 SLR x10 bilateral-NP  Bridge 5s x 20 G TB  TB row/ext G 2x10 ea  TB paloff press G x20 bilateral  Standing hip ext/and GTB loop x 20 ea    Manual Therapy:   minutes      Neuromuscular Re-education:  minutes      Therapeutic Activity:  minutes      Gait Training:   minutes      Modalities:  Vasopneumatic Device  minutes  Electrical Stimulation  minutes  Ultrasound  minutes  Iontophoresis  minutes  Cold Pack  minutes    Evaluation Complexity: Low:   minutes; Moderate   minutes; Complex   minutes    Re-Evaluation:   minutes

## 2024-06-05 ENCOUNTER — TELEMEDICINE (OUTPATIENT)
Dept: PHARMACY | Facility: HOSPITAL | Age: 54
End: 2024-06-05
Payer: COMMERCIAL

## 2024-06-05 DIAGNOSIS — E66.2 CLASS 3 OBESITY WITH ALVEOLAR HYPOVENTILATION, SERIOUS COMORBIDITY, AND BODY MASS INDEX (BMI) OF 60.0 TO 69.9 IN ADULT (MULTI): ICD-10-CM

## 2024-06-06 ENCOUNTER — TREATMENT (OUTPATIENT)
Dept: PHYSICAL THERAPY | Facility: CLINIC | Age: 54
End: 2024-06-06
Payer: COMMERCIAL

## 2024-06-06 DIAGNOSIS — M54.50 CHRONIC LOW BACK PAIN WITHOUT SCIATICA, UNSPECIFIED BACK PAIN LATERALITY: ICD-10-CM

## 2024-06-06 DIAGNOSIS — G89.29 CHRONIC LOW BACK PAIN WITHOUT SCIATICA, UNSPECIFIED BACK PAIN LATERALITY: ICD-10-CM

## 2024-06-06 PROCEDURE — 97110 THERAPEUTIC EXERCISES: CPT | Mod: GP | Performed by: PHYSICAL THERAPIST

## 2024-06-06 NOTE — PROGRESS NOTES
PHYSICAL THERAPY DISCHARGE NOTE    Patient Name:  Neptali Estrada   Patient MRN: [id number]  Date: 06/06/24  Time Calculation  Start Time: 1020  Stop Time: 1105  Time Calculation (min): 45 min    Insurance:  Visit number: 6 of 6  Insurance Type: Payor: [facility] / Plan: [facility] AGED BLIND AND DISABLED / Product Type: *No Product type* /   Authorization or Plan of Care date Range: exp 6/7/24    Problem List Items Addressed This Visit             ICD-10-CM    Chronic low back pain without sciatica M54.50, G89.29     General:  Reason for visit: LBP   Referred by: MD Lizzy Chou MD appt:  8/2/24     Precautions:  none  Fall Risk: Low    Assessment:  Education: Reviewed home exercise program. Answered questions about home exercise program.  Progress towards functional goals: Improved ability to sleep thru the night. Improved walking distance. Reduced frequency of pain. Reduced intensity of pain.  Response to interventions: Tolerated treatment session well without any increase in pain. Improved independence with home exercises. Improved tolerance to strengthening progressions.  Justification for continued skilled care: To address remaining functional, objective and subjective deficits to allow them to return to full independence with ADLs.    Plan:  Discharge from physical therapy.    Subjective:   Neptali reports he feels like his condition is improving.  Feels less pain down the legs and can manage symptoms with program fairly well   Pain (0-10): 0    HEP adherence / understanding: compliance with the instructed home exercises.    Objective:    Goals:  -Patient to be Indep with HEP for self management of symptoms-MET     -Abolish LE radiating/radicular symptoms-MET, less shooting pains down the foot     -Modified Oswestry: 0-19% impairment to indicate a significant improvement in overall  "function.-MET 18%     -Patient will demonstrate correct posture with min to no cueing to allow for correct loading strategy. -MET     -Patient will demonstrate 5/5 hip strength to allow for correct gait and stair mechanics.-MET, improved     -Patient will demo mild to no limitation AROM of the lumbar spine to allow for correct mechanics with functional mobility. -MET, some pain with extension     -Patient will report standing 60 minutes without pain to allow for return to work and ADLs without limitation. -partially met, limited due to knee pain     -Patient will report sitting 60 minutes without pain to allow for return to work and ADLs without limitation.- partially met, has to shift positions     -Patient will demonstrate appropriate body mechanics for household and common activities to reduce incidence or future back pain exacerbations-MET    Treatment Performed: (\"NP\" = Not Performed)     HEP: Access Code: [email or web address]     Therapeutic Exercise: 45 minutes  Seated SB flexion stretch 10s x 10  DKTC and LTR with SB 5s x 20 ea  TA activation in hooklying 5s x 10-NP  Hooklying hip abd/add 5s x 20 ea G TB  Hooklying marching x30 G TB  Clam 2x10 bilateral G TB  SAQ 2x10 bilateral-NP  1/2 SLR x10 bilateral-NP  Bridge 5s x 20 G TB  TB row/ext G 2x10 ea  TB paloff press G x20 bilateral  Standing hip ext/and GTB loop x 20 ea    Manual Therapy:   minutes      Neuromuscular Re-education:  minutes      Therapeutic Activity:  minutes      Gait Training:   minutes      Modalities:  Vasopneumatic Device  minutes  Electrical Stimulation  minutes  Ultrasound  minutes  Iontophoresis  minutes  Cold Pack  minutes    Evaluation Complexity: Low:   minutes; Moderate   minutes; Complex   minutes    Re-Evaluation:   minutes           "

## 2024-06-26 ENCOUNTER — TELEPHONE (OUTPATIENT)
Dept: PRIMARY CARE | Facility: CLINIC | Age: 54
End: 2024-06-26
Payer: COMMERCIAL

## 2024-07-01 ENCOUNTER — APPOINTMENT (OUTPATIENT)
Dept: PHARMACY | Facility: HOSPITAL | Age: 54
End: 2024-07-01
Payer: COMMERCIAL

## 2024-07-01 DIAGNOSIS — E66.2 CLASS 3 OBESITY WITH ALVEOLAR HYPOVENTILATION, SERIOUS COMORBIDITY, AND BODY MASS INDEX (BMI) OF 60.0 TO 69.9 IN ADULT (MULTI): ICD-10-CM

## 2024-07-01 DIAGNOSIS — M25.59 PAIN IN OTHER JOINT: ICD-10-CM

## 2024-07-01 DIAGNOSIS — I10 ESSENTIAL HYPERTENSION: ICD-10-CM

## 2024-07-01 DIAGNOSIS — J45.909 ASTHMA, UNSPECIFIED ASTHMA SEVERITY, UNSPECIFIED WHETHER COMPLICATED, UNSPECIFIED WHETHER PERSISTENT (HHS-HCC): ICD-10-CM

## 2024-07-01 DIAGNOSIS — E78.5 DYSLIPIDEMIA: ICD-10-CM

## 2024-07-01 RX ORDER — CETIRIZINE HYDROCHLORIDE 10 MG/1
10 TABLET ORAL DAILY
Qty: 30 TABLET | Refills: 1 | Status: SHIPPED | OUTPATIENT
Start: 2024-07-01

## 2024-07-01 RX ORDER — ATORVASTATIN CALCIUM 10 MG/1
10 TABLET, FILM COATED ORAL DAILY
Qty: 30 TABLET | Refills: 1 | Status: SHIPPED | OUTPATIENT
Start: 2024-07-01 | End: 2024-12-28

## 2024-07-01 RX ORDER — LISINOPRIL 20 MG/1
20 TABLET ORAL DAILY
Qty: 30 TABLET | Refills: 1 | Status: SHIPPED | OUTPATIENT
Start: 2024-07-01

## 2024-07-01 RX ORDER — DULAGLUTIDE 1.5 MG/.5ML
1.5 INJECTION, SOLUTION SUBCUTANEOUS
Qty: 2 ML | Refills: 2 | Status: SHIPPED | OUTPATIENT
Start: 2024-07-01

## 2024-07-01 RX ORDER — FLUTICASONE PROPIONATE 50 MCG
2 SPRAY, SUSPENSION (ML) NASAL
Qty: 16 G | Refills: 1 | Status: SHIPPED | OUTPATIENT
Start: 2024-07-01

## 2024-07-01 RX ORDER — MONTELUKAST SODIUM 10 MG/1
10 TABLET ORAL NIGHTLY
Qty: 30 TABLET | Refills: 1 | Status: SHIPPED | OUTPATIENT
Start: 2024-07-01

## 2024-07-01 RX ORDER — ETODOLAC 400 MG/1
400 TABLET, FILM COATED ORAL 2 TIMES DAILY
Qty: 60 TABLET | Refills: 1 | Status: SHIPPED | OUTPATIENT
Start: 2024-07-01

## 2024-07-01 NOTE — PROGRESS NOTES
Pharmacist Clinic: Weight Management    Neptali Estrada was referred to the Clinical Pharmacy Team for weight management.     Referring Provider: Lynne Magana MD    HISTORY OF PRESENT ILLNESS  Patient was referred to Clinical Pharmacy for weight loss management.     Patient reports weight loss has improved his energy, blood pressure, and strain on his joints. Denies side effects or low BG symptoms with GLP-1 therapy.      Diet:   -Breakfast: eggs, toast  -Lunch: salad or sandwich with chips  -Dinner: chicken and vegetables, pasta, eats meal around 6pm  -Snacks: peanuts, cashews     Exercise Routine: per patient, unable to get much exercise with pain from knees. Patient states he needs a knee replacement, but needs to lose weight prior to surgery.    Additional Contributory Factors/Comorbidities:  Dyslipidemia, hypertension.    Weight   Per patient, home scale does not register over 400 lbs, will give error reading (no home weight prior to PCP visit)  423 lbs (initial -at PCP appointment)  399 lbs -5/8/2024   379 lbs -6/5/2024  362 lbs -7/1/2024    PHARMACY ASSESSMENT  Pertinent PMH Review:  - PMH of Pancreatitis: No  - PMH of Retinopathy: No  - PMH of MTC: No    Allergies: Patient has no known allergies.     [id number] - [place], [place] - [address]  [address]  [facility] [address]  Phone: [phone] Fax: [phone]    [address] [address]  Phone: [phone] Fax: [phone]      Affordability/Accessibility:   -Trulicity: covered by insurance  -Wegovy: PA  -Zepbound: PA  -Ozempic: PA  -Mounjaro: PA    Adherence/Organization: no issues per patient  Adverse Effects: none     CURRENT PHARMACOTHERAPY  - Trulicity 1.5 mg under the skin once weekly    ASCVD PREVENTION  -Statin? Yes, atorvastatin 10mg  -Aspirin? No    MEDICATION RECONCILIATION  No changes to medication record at this time. Patient able to confirm medications and  directions.    DRUG INTERACTIONS  - No significant drug-drug interactions exist that require adjustment to therapy    LAB  Lab Results   Component Value Date    BILITOT 0.6 03/26/2024    CALCIUM 9.7 03/26/2024    CO2 27 03/26/2024     03/26/2024    CREATININE 1.11 03/26/2024    GLUCOSE 93 03/26/2024    ALKPHOS 62 03/26/2024    K 4.9 03/26/2024    PROT 7.2 03/26/2024     03/26/2024    AST 15 03/26/2024    ALT 19 03/26/2024    BUN 19 03/26/2024    ANIONGAP 16 03/26/2024    ALBUMIN 4.4 03/26/2024    EGFR 79 03/26/2024     Lab Results   Component Value Date    TRIG 177 (H) 03/26/2024    CHOL 152 03/26/2024    LDLCALC 64 03/26/2024    HDL 53.0 03/26/2024     Lab Results   Component Value Date    HGBA1C 4.9 04/03/2024       Current Outpatient Medications on File Prior to Visit   Medication Sig Dispense Refill    albuterol (Ventolin HFA) 90 mcg/actuation inhaler Inhale 2 puffs every 4 hours if needed for wheezing. 18 g 2    atorvastatin (Lipitor) 10 mg tablet Take 1 tablet (10 mg) by mouth once daily. 30 tablet 2    cetirizine (ZyrTEC) 10 mg tablet Take 1 tablet (10 mg) by mouth once daily. 30 tablet 2    dulaglutide (Trulicity) 1.5 mg/0.5 mL pen injector injection Inject 1.5 mg under the skin 1 (one) time per week. 2 mL 1    etodolac (Lodine) 400 mg tablet Take 1 tablet (400 mg) by mouth 2 times a day. 60 tablet 2    fluticasone (Flonase) 50 mcg/actuation nasal spray Administer 2 sprays into each nostril once daily. 16 g 2    lisinopril 20 mg tablet Take 1 tablet (20 mg) by mouth once daily. 30 tablet 2    montelukast (Singulair) 10 mg tablet Take 1 tablet (10 mg) by mouth once daily at bedtime. 30 tablet 2    sodium hyaluronate (Gelsyn-3) 16.8 mg/2 mL injection Inject 2 mL (16.8 mg) into the joint every 7 days. Inject one 16.8mg/2ml prefilled syringe intra-articularly into the left knee once weekly for three weeks; Qty: 1 series, 3 injections 6 mL 0     No current facility-administered medications on file  prior to visit.       PATIENT EDUCATION/GOALS  - Counseled patient on MOA, expectations, side effects, duration of therapy, contraindications, administration, and monitoring parameters  - Answered all patient questions and concerns; provided [facility] phone number if issues/questions arise    -We have discussed the off-label use of once-weekly Trulicity which is indicated and approved by the FDA for Type Two Diabetes Mellitus treatment. Medication cost and coverage for this agent may change at any time as determined by your primary insurance carrier.     RECOMMENDATIONS/PLAN  Problem List Items Addressed This Visit       Class 3 obesity with alveolar hypoventilation, serious comorbidity, and body mass index (BMI) of 60.0 to 69.9 in adult (Multi)         Weight Loss Plan: Patient is doing very well on current weight loss pharmacotherapy. Would prefer to increase dose today but 3 mg is unavailable d/t supply chain issues. Will continue current dose.  Continue: Trulicity 1.5 mg once weekly    Clinical Pharmacist follow up: 7/31/2024 @ 9:00 AM  Type of Encounter: Virtual    Continue all meds under the continuation of care with the referring provider and clinical pharmacy team.    Thank you,    Johnny Billy, PharmD, BCACP  [phone]    Verbal consent to manage patient's drug therapy was obtained from patient. They were informed they may decline to participate or withdraw from participation in pharmacy services at any time.

## 2024-07-01 NOTE — TELEPHONE ENCOUNTER
Pt called ldm on machine requesting Rx refill on   Atorvastatin 10 mg   Cetirizine 10 mg   Etodolac 400 mg   Fluticasone 50 mcg   Lisinopril 20 mg   Montelukast 10 mg   Send to [facility]     Last seen 02/21/23  Scheduled 08/05/24  Last B/w 04/03/24

## 2024-07-03 ENCOUNTER — APPOINTMENT (OUTPATIENT)
Dept: PHARMACY | Facility: HOSPITAL | Age: 54
End: 2024-07-03
Payer: COMMERCIAL

## 2024-07-25 DIAGNOSIS — I10 ESSENTIAL HYPERTENSION: ICD-10-CM

## 2024-07-25 DIAGNOSIS — E78.5 DYSLIPIDEMIA: ICD-10-CM

## 2024-07-25 DIAGNOSIS — E66.2 CLASS 3 OBESITY WITH ALVEOLAR HYPOVENTILATION, SERIOUS COMORBIDITY, AND BODY MASS INDEX (BMI) OF 60.0 TO 69.9 IN ADULT (MULTI): ICD-10-CM

## 2024-07-30 ENCOUNTER — APPOINTMENT (OUTPATIENT)
Dept: ORTHOPEDIC SURGERY | Facility: CLINIC | Age: 54
End: 2024-07-30
Payer: COMMERCIAL

## 2024-07-30 VITALS — WEIGHT: 315 LBS | HEIGHT: 70 IN | BODY MASS INDEX: 45.1 KG/M2

## 2024-07-30 DIAGNOSIS — G89.29 CHRONIC PAIN OF LEFT KNEE: ICD-10-CM

## 2024-07-30 DIAGNOSIS — M17.12 ARTHRITIS OF KNEE, LEFT: Primary | ICD-10-CM

## 2024-07-30 DIAGNOSIS — M25.562 CHRONIC PAIN OF LEFT KNEE: ICD-10-CM

## 2024-07-30 PROCEDURE — 1036F TOBACCO NON-USER: CPT | Performed by: ORTHOPAEDIC SURGERY

## 2024-07-30 PROCEDURE — 20610 DRAIN/INJ JOINT/BURSA W/O US: CPT | Performed by: ORTHOPAEDIC SURGERY

## 2024-07-30 PROCEDURE — 3008F BODY MASS INDEX DOCD: CPT | Performed by: ORTHOPAEDIC SURGERY

## 2024-07-31 ENCOUNTER — APPOINTMENT (OUTPATIENT)
Dept: ORTHOPEDIC SURGERY | Facility: CLINIC | Age: 54
End: 2024-07-31
Payer: COMMERCIAL

## 2024-07-31 ENCOUNTER — APPOINTMENT (OUTPATIENT)
Dept: PHARMACY | Facility: HOSPITAL | Age: 54
End: 2024-07-31
Payer: COMMERCIAL

## 2024-07-31 ENCOUNTER — LAB (OUTPATIENT)
Dept: LAB | Facility: LAB | Age: 54
End: 2024-07-31
Payer: COMMERCIAL

## 2024-07-31 DIAGNOSIS — I10 ESSENTIAL HYPERTENSION: ICD-10-CM

## 2024-07-31 DIAGNOSIS — E66.2 CLASS 3 OBESITY WITH ALVEOLAR HYPOVENTILATION, SERIOUS COMORBIDITY, AND BODY MASS INDEX (BMI) OF 60.0 TO 69.9 IN ADULT (MULTI): ICD-10-CM

## 2024-07-31 DIAGNOSIS — E78.5 DYSLIPIDEMIA: ICD-10-CM

## 2024-07-31 LAB
ALBUMIN SERPL BCP-MCNC: 4.3 G/DL (ref 3.4–5)
ALP SERPL-CCNC: 57 U/L (ref 33–120)
ALT SERPL W P-5'-P-CCNC: 15 U/L (ref 10–52)
ANION GAP SERPL CALC-SCNC: 12 MMOL/L (ref 10–20)
AST SERPL W P-5'-P-CCNC: 14 U/L (ref 9–39)
BILIRUB SERPL-MCNC: 0.7 MG/DL (ref 0–1.2)
BUN SERPL-MCNC: 21 MG/DL (ref 6–23)
CALCIUM SERPL-MCNC: 9.6 MG/DL (ref 8.6–10.3)
CHLORIDE SERPL-SCNC: 107 MMOL/L (ref 98–107)
CHOLEST SERPL-MCNC: 126 MG/DL (ref 0–199)
CHOLESTEROL/HDL RATIO: 3.2
CO2 SERPL-SCNC: 26 MMOL/L (ref 21–32)
CREAT SERPL-MCNC: 0.95 MG/DL (ref 0.5–1.3)
EGFRCR SERPLBLD CKD-EPI 2021: >90 ML/MIN/1.73M*2
EST. AVERAGE GLUCOSE BLD GHB EST-MCNC: 88 MG/DL
GLUCOSE SERPL-MCNC: 81 MG/DL (ref 74–99)
HBA1C MFR BLD: 4.7 %
HDLC SERPL-MCNC: 40 MG/DL
LDLC SERPL CALC-MCNC: 68 MG/DL
NON HDL CHOLESTEROL: 86 MG/DL (ref 0–149)
POTASSIUM SERPL-SCNC: 4.3 MMOL/L (ref 3.5–5.3)
PROT SERPL-MCNC: 7 G/DL (ref 6.4–8.2)
SODIUM SERPL-SCNC: 141 MMOL/L (ref 136–145)
TRIGL SERPL-MCNC: 90 MG/DL (ref 0–149)
VLDL: 18 MG/DL (ref 0–40)

## 2024-07-31 PROCEDURE — 80061 LIPID PANEL: CPT

## 2024-07-31 PROCEDURE — 83036 HEMOGLOBIN GLYCOSYLATED A1C: CPT

## 2024-07-31 PROCEDURE — 36415 COLL VENOUS BLD VENIPUNCTURE: CPT

## 2024-07-31 PROCEDURE — 80053 COMPREHEN METABOLIC PANEL: CPT

## 2024-07-31 RX ORDER — DULAGLUTIDE 3 MG/.5ML
3 INJECTION, SOLUTION SUBCUTANEOUS
Qty: 2 ML | Refills: 5 | Status: SHIPPED | OUTPATIENT
Start: 2024-08-04

## 2024-07-31 RX ORDER — TRIAMCINOLONE ACETONIDE 40 MG/ML
40 INJECTION, SUSPENSION INTRA-ARTICULAR; INTRAMUSCULAR
Status: COMPLETED | OUTPATIENT
Start: 2024-07-31 | End: 2024-07-31

## 2024-07-31 RX ORDER — LIDOCAINE HYDROCHLORIDE 10 MG/ML
2 INJECTION INFILTRATION; PERINEURAL
Status: COMPLETED | OUTPATIENT
Start: 2024-07-31 | End: 2024-07-31

## 2024-07-31 NOTE — PROGRESS NOTES
Pharmacist Clinic: Weight Management    Neptali Estrada was referred to the Clinical Pharmacy Team for weight management.     Referring Provider: Lynne Magana MD    HISTORY OF PRESENT ILLNESS  Patient was referred to Clinical Pharmacy for weight loss management.     Patient continuing to follow recommendations from dietician to reduce weight through diet. Additionally, doing yoga 5 days per week. Focusing on stretching to improve his back and shoulders.      Diet:   -Breakfast: eggs, toast  -Lunch: salad or sandwich with chips  -Dinner: chicken and vegetables, pasta, eats meal around 6pm  -Snacks: peanuts, cashews     Exercise Routine: per patient, unable to get much exercise with pain from knees. Patient states he needs a knee replacement, but needs to lose weight prior to surgery.    Additional Contributory Factors/Comorbidities:  Dyslipidemia, hypertension.    Weight   Per patient, home scale does not register over 400 lbs, will give error reading (no home weight prior to PCP visit)  423 lbs (initial -at PCP appointment)  399 lbs -5/8/2024   379 lbs -6/5/2024  362 lbs -7/1/2024  335 lbs -7/31/2024    PHARMACY ASSESSMENT  Pertinent PMH Review:  - PMH of Pancreatitis: No  - PMH of Retinopathy: No  - PMH of MTC: No    Allergies: Patient has no known allergies.     [id number] - [place], [place] - [address]  [address] [facility] [address]  Phone: [phone] Fax: [phone]    [address] [address]  Phone: [phone] Fax: [phone]      Affordability/Accessibility:   -Trulicity: covered by insurance  -Wegovy: PA  -Zepbound: PA  -Ozempic: PA  -Mounjaro: PA    Adherence/Organization: no issues per patient  Adverse Effects: none     CURRENT PHARMACOTHERAPY  - Trulicity 1.5 mg under the skin once weekly    ASCVD PREVENTION  -Statin? Yes, atorvastatin 10mg  -Aspirin? No    MEDICATION RECONCILIATION  No changes to medication record  at this time. Patient able to confirm medications and directions.    DRUG INTERACTIONS  - No significant drug-drug interactions exist that require adjustment to therapy    LAB  Lab Results   Component Value Date    BILITOT 0.6 03/26/2024    CALCIUM 9.7 03/26/2024    CO2 27 03/26/2024     03/26/2024    CREATININE 1.11 03/26/2024    GLUCOSE 93 03/26/2024    ALKPHOS 62 03/26/2024    K 4.9 03/26/2024    PROT 7.2 03/26/2024     03/26/2024    AST 15 03/26/2024    ALT 19 03/26/2024    BUN 19 03/26/2024    ANIONGAP 16 03/26/2024    ALBUMIN 4.4 03/26/2024    EGFR 79 03/26/2024     Lab Results   Component Value Date    TRIG 177 (H) 03/26/2024    CHOL 152 03/26/2024    LDLCALC 64 03/26/2024    HDL 53.0 03/26/2024     Lab Results   Component Value Date    HGBA1C 4.9 04/03/2024       Current Outpatient Medications on File Prior to Visit   Medication Sig Dispense Refill    albuterol (Ventolin HFA) 90 mcg/actuation inhaler Inhale 2 puffs every 4 hours if needed for wheezing. 18 g 2    atorvastatin (Lipitor) 10 mg tablet Take 1 tablet (10 mg) by mouth once daily. 30 tablet 1    cetirizine (ZyrTEC) 10 mg tablet Take 1 tablet (10 mg) by mouth once daily. 30 tablet 1    dulaglutide (Trulicity) 1.5 mg/0.5 mL pen injector injection Inject 1.5 mg under the skin 1 (one) time per week. 2 mL 2    etodolac (Lodine) 400 mg tablet Take 1 tablet (400 mg) by mouth 2 times a day. 60 tablet 1    fluticasone (Flonase) 50 mcg/actuation nasal spray Administer 2 sprays into each nostril once daily. 16 g 1    lisinopril 20 mg tablet Take 1 tablet (20 mg) by mouth once daily. 30 tablet 1    montelukast (Singulair) 10 mg tablet Take 1 tablet (10 mg) by mouth once daily at bedtime. 30 tablet 1    sodium hyaluronate (Gelsyn-3) 16.8 mg/2 mL injection Inject 2 mL (16.8 mg) into the joint every 7 days. Inject one 16.8mg/2ml prefilled syringe intra-articularly into the left knee once weekly for three weeks; Qty: 1 series, 3 injections 6 mL 0     No  current facility-administered medications on file prior to visit.       PATIENT EDUCATION/GOALS  - Counseled patient on MOA, expectations, side effects, duration of therapy, contraindications, administration, and monitoring parameters  - Answered all patient questions and concerns; provided [facility] phone number if issues/questions arise    -We have discussed the off-label use of once-weekly Trulicity which is indicated and approved by the FDA for Type Two Diabetes Mellitus treatment. Medication cost and coverage for this agent may change at any time as determined by your primary insurance carrier.     RECOMMENDATIONS/PLAN  Problem List Items Addressed This Visit       Class 3 obesity with alveolar hypoventilation, serious comorbidity, and body mass index (BMI) of 60.0 to 69.9 in adult (Multi)         Weight Loss Plan: patient is still achieving weight loss with current dose but would prefer to increase dose to further weight loss.   Increase: Trulicity 3 mg once weekly    Clinical Pharmacist follow up: 8/28/2024 @ 9:30 AM  Type of Encounter: Virtual    Continue all meds under the continuation of care with the referring provider and clinical pharmacy team.    Thank you,    Johnny Billy, PharmD, [facility]  [phone]    Verbal consent to manage patient's drug therapy was obtained from patient. They were informed they may decline to participate or withdraw from participation in pharmacy services at any time.

## 2024-07-31 NOTE — PROGRESS NOTES
54-year-old is seen with the left knee pain.  Has been having persistent severe sharp shooting pain in the left knee.  Pain is worse with standing and walking going up and down stairs and getting up and down from a chair in and out of a car.  Is been working on weight loss and he has lost 80 pounds.    Pleasant and no acute distress. Walks with antalgic gait. Stands with varus alignment of the left knee and neutral on the right. Left knee range of motion is 5-110°. The knee is stable to varus and valgus stress Lachman and posterior drawer. There is a mild effusion. There is generalized tenderness. Right knee range of motion is 0-120°. There is no effusion. The knee is stable to varus and valgus stress Lachman and posterior drawer. Both lower extremities are well perfused the skin is intact and muscle tone is adequate.    A discussion about knee arthritis was done.  Treatment options were reviewed.  The decision was made to proceed with cortisone injection.  He would benefit from a economy hinged type knee brace.  Will follow-up based on his symptoms and continue working on weight loss and exercise.    L Inj/Asp: L knee on 7/31/2024 4:27 PM  Indications: pain  Details: 22 G needle, superolateral approach  Medications: 40 mg triamcinolone acetonide 40 mg/mL; 2 mL lidocaine 10 mg/mL (1 %)  Procedure, treatment alternatives, risks and benefits explained, specific risks discussed. Consent was given by the patient.

## 2024-08-02 ENCOUNTER — APPOINTMENT (OUTPATIENT)
Dept: PRIMARY CARE | Facility: CLINIC | Age: 54
End: 2024-08-02
Payer: COMMERCIAL

## 2024-08-05 ENCOUNTER — APPOINTMENT (OUTPATIENT)
Dept: PRIMARY CARE | Facility: CLINIC | Age: 54
End: 2024-08-05
Payer: COMMERCIAL

## 2024-08-05 VITALS
WEIGHT: 315 LBS | HEART RATE: 76 BPM | TEMPERATURE: 98.5 F | DIASTOLIC BLOOD PRESSURE: 72 MMHG | HEIGHT: 70 IN | RESPIRATION RATE: 18 BRPM | BODY MASS INDEX: 45.1 KG/M2 | SYSTOLIC BLOOD PRESSURE: 120 MMHG

## 2024-08-05 DIAGNOSIS — I10 ESSENTIAL HYPERTENSION: ICD-10-CM

## 2024-08-05 DIAGNOSIS — M25.59 PAIN IN OTHER JOINT: ICD-10-CM

## 2024-08-05 DIAGNOSIS — E66.1 CLASS 3 DRUG-INDUCED OBESITY WITH SERIOUS COMORBIDITY AND BODY MASS INDEX (BMI) OF 50.0 TO 59.9 IN ADULT (MULTI): Primary | ICD-10-CM

## 2024-08-05 DIAGNOSIS — E78.5 DYSLIPIDEMIA: ICD-10-CM

## 2024-08-05 DIAGNOSIS — J45.909 ASTHMA, UNSPECIFIED ASTHMA SEVERITY, UNSPECIFIED WHETHER COMPLICATED, UNSPECIFIED WHETHER PERSISTENT (HHS-HCC): ICD-10-CM

## 2024-08-05 PROCEDURE — 99214 OFFICE O/P EST MOD 30 MIN: CPT | Performed by: INTERNAL MEDICINE

## 2024-08-05 PROCEDURE — 3074F SYST BP LT 130 MM HG: CPT | Performed by: INTERNAL MEDICINE

## 2024-08-05 PROCEDURE — 1036F TOBACCO NON-USER: CPT | Performed by: INTERNAL MEDICINE

## 2024-08-05 PROCEDURE — 3008F BODY MASS INDEX DOCD: CPT | Performed by: INTERNAL MEDICINE

## 2024-08-05 PROCEDURE — 3078F DIAST BP <80 MM HG: CPT | Performed by: INTERNAL MEDICINE

## 2024-08-05 RX ORDER — MONTELUKAST SODIUM 10 MG/1
10 TABLET ORAL NIGHTLY
Qty: 30 TABLET | Refills: 5 | Status: SHIPPED | OUTPATIENT
Start: 2024-08-05

## 2024-08-05 RX ORDER — ETODOLAC 400 MG/1
400 TABLET, FILM COATED ORAL 2 TIMES DAILY
Qty: 60 TABLET | Refills: 5 | Status: SHIPPED | OUTPATIENT
Start: 2024-08-05

## 2024-08-05 RX ORDER — FLUTICASONE PROPIONATE 50 MCG
2 SPRAY, SUSPENSION (ML) NASAL
Qty: 16 G | Refills: 5 | Status: SHIPPED | OUTPATIENT
Start: 2024-08-05

## 2024-08-05 RX ORDER — CETIRIZINE HYDROCHLORIDE 10 MG/1
10 TABLET ORAL DAILY
Qty: 30 TABLET | Refills: 5 | Status: SHIPPED | OUTPATIENT
Start: 2024-08-05

## 2024-08-05 RX ORDER — LISINOPRIL 20 MG/1
20 TABLET ORAL DAILY
Qty: 30 TABLET | Refills: 5 | Status: SHIPPED | OUTPATIENT
Start: 2024-08-05

## 2024-08-05 RX ORDER — ATORVASTATIN CALCIUM 10 MG/1
10 TABLET, FILM COATED ORAL DAILY
Qty: 30 TABLET | Refills: 5 | Status: SHIPPED | OUTPATIENT
Start: 2024-08-05 | End: 2025-02-01

## 2024-08-05 ASSESSMENT — PATIENT HEALTH QUESTIONNAIRE - PHQ9
1. LITTLE INTEREST OR PLEASURE IN DOING THINGS: NOT AT ALL
2. FEELING DOWN, DEPRESSED OR HOPELESS: NOT AT ALL
SUM OF ALL RESPONSES TO PHQ9 QUESTIONS 1 AND 2: 0

## 2024-08-05 NOTE — PROGRESS NOTES
"Subjective   Patient ID: Neptali Estrada is a 54 y.o. male who presents for Follow-up (Meds, HTN, BW results).  HPI  Patient presents today for follow-up of hypertension elevated BMI fasting hyperglycemia.  He continues on Trulicity.  His goal weight is 275 so that he can have his knee surgery done.  He continues to get steroid injections once every 3 months to hold him off until he can get that surgery.  He has lost according to his home scale about 50 pounds.  He is encouraged by his weight loss since April.  He has no issues with the Trulicity.  He denies chest pains headaches dizziness lightheadedness or shortness of breath.  He has no lower extremity edema.  He has been compliant with his medications and his blood pressure has been good    Review of Systems  Review of systems was performed and is otherwise negative except as noted in HPI.  Objective   /72   Pulse 76   Temp 36.9 °C (98.5 °F)   Resp 18   Ht 1.778 m (5' 10\")   Wt (!) 161 kg (354 lb)   BMI 50.79 kg/m²    Physical Exam  HEENT is normal  Lungs clear bilaterally  Heart is regular rate rhythm no murmurs  Abdomen benign  Lower extremities no edema    Assessment/Plan   Diagnoses and all orders for this visit:  Class 3 drug-induced obesity with serious comorbidity and body mass index (BMI) of 50.0 to 59.9 in adult (Multi)  -     Hemoglobin A1C; Future  -     Lipid Panel; Future  -     Comprehensive Metabolic Panel; Future  -     Follow Up In [facility] - PCP - Established; Future  Dyslipidemia  -     atorvastatin (Lipitor) 10 mg tablet; Take 1 tablet (10 mg) by mouth once daily.  -     Hemoglobin A1C; Future  -     Lipid Panel; Future  -     Comprehensive Metabolic Panel; Future  -     Follow Up In [facility] - PCP - Established; Future  Asthma, unspecified asthma severity, unspecified whether complicated, unspecified whether persistent ([facility])  -     cetirizine (ZyrTEC) 10 mg tablet; Take 1 tablet (10 mg) by mouth once " daily.  -     fluticasone (Flonase) 50 mcg/actuation nasal spray; Administer 2 sprays into each nostril once daily.  -     montelukast (Singulair) 10 mg tablet; Take 1 tablet (10 mg) by mouth once daily at bedtime.  Pain in other joint  -     etodolac (Lodine) 400 mg tablet; Take 1 tablet (400 mg) by mouth 2 times a day.  Essential hypertension  -     lisinopril 20 mg tablet; Take 1 tablet (20 mg) by mouth once daily.  -     Hemoglobin A1C; Future  -     Lipid Panel; Future  -     Comprehensive Metabolic Panel; Future  -     Follow Up In [facility] - PCP - Established; Future    Call with issues  Blood work ordered  Refills are sent  See me in 3 months  Continue to work with pharmacy regarding GLP-1 therapy  His weight loss is encouraging he needs to continue to work on diet and exercise as possible  Lynne Magana MD

## 2024-08-28 ENCOUNTER — APPOINTMENT (OUTPATIENT)
Dept: PHARMACY | Facility: HOSPITAL | Age: 54
End: 2024-08-28
Payer: COMMERCIAL

## 2024-08-28 DIAGNOSIS — E66.2 CLASS 3 OBESITY WITH ALVEOLAR HYPOVENTILATION, SERIOUS COMORBIDITY, AND BODY MASS INDEX (BMI) OF 60.0 TO 69.9 IN ADULT (MULTI): ICD-10-CM

## 2024-08-28 NOTE — PROGRESS NOTES
Pharmacist Clinic: Weight Management    Neptali Estrada was referred to the Clinical Pharmacy Team for weight management.     Referring Provider: Lynne Magana MD    HISTORY OF PRESENT ILLNESS  Patient was referred to Clinical Pharmacy for weight loss management.     Patient denies issues with Trulicity 3 mg dose. Continues to eat smaller portions and is not getting hungry for snacks.      Diet:   -Breakfast: eggs, toast  -Lunch: salad or sandwich with chips  -Dinner: chicken and vegetables, pasta, eats meal around 6pm  -Snacks: peanuts, cashews     Exercise Routine: per patient, unable to get much exercise with pain from knees. Patient states he needs a knee replacement, but needs to lose weight prior to surgery.    Additional Contributory Factors/Comorbidities:  Dyslipidemia, hypertension.    Weight   Per patient, home scale does not register over 400 lbs, will give error reading (no home weight prior to PCP visit)  423 lbs (initial -at PCP appointment)  399 lbs -5/8/2024   379 lbs -6/5/2024  362 lbs -7/1/2024  335 lbs -7/31/2024  354 lbs -8/5/2024 (office scale)  315 lbs -8/28/2024    *Of note, patient bought a new scale prior to starting Trulicity, unclear why there is such a big difference between office and patient's home scale.       PHARMACY ASSESSMENT  Pertinent PMH Review:  - PMH of Pancreatitis: No  - PMH of Retinopathy: No  - PMH of MTC: No    Allergies: Patient has no known allergies.     [id number] - [place], [place] - [address]  [address]  [facility] [address]  Phone: [phone] Fax: [phone]    [address] [address]  Phone: [phone] Fax: [phone]      Affordability/Accessibility:   -Trulicity: covered by insurance  -Wegovy: PA  -Zepbound: PA  -Ozempic: PA  -Mounjaro: PA    Adherence/Organization: no issues per patient  Adverse Effects: none     CURRENT PHARMACOTHERAPY  - Trulicity 3 mg under the skin once  weekly    ASCVD PREVENTION  -Statin? Yes, atorvastatin 10mg  -Aspirin? No    MEDICATION RECONCILIATION  No changes to medication record at this time. Patient able to confirm medications and directions.    DRUG INTERACTIONS  - No significant drug-drug interactions exist that require adjustment to therapy    LAB  Lab Results   Component Value Date    BILITOT 0.7 07/31/2024    CALCIUM 9.6 07/31/2024    CO2 26 07/31/2024     07/31/2024    CREATININE 0.95 07/31/2024    GLUCOSE 81 07/31/2024    ALKPHOS 57 07/31/2024    K 4.3 07/31/2024    PROT 7.0 07/31/2024     07/31/2024    AST 14 07/31/2024    ALT 15 07/31/2024    BUN 21 07/31/2024    ANIONGAP 12 07/31/2024    ALBUMIN 4.3 07/31/2024    EGFR >90 07/31/2024     Lab Results   Component Value Date    TRIG 90 07/31/2024    CHOL 126 07/31/2024    LDLCALC 68 07/31/2024    HDL 40.0 07/31/2024     Lab Results   Component Value Date    HGBA1C 4.7 07/31/2024       Current Outpatient Medications on File Prior to Visit   Medication Sig Dispense Refill    albuterol (Ventolin HFA) 90 mcg/actuation inhaler Inhale 2 puffs every 4 hours if needed for wheezing. 18 g 2    atorvastatin (Lipitor) 10 mg tablet Take 1 tablet (10 mg) by mouth once daily. 30 tablet 5    cetirizine (ZyrTEC) 10 mg tablet Take 1 tablet (10 mg) by mouth once daily. 30 tablet 5    dulaglutide (Trulicity) 3 mg/0.5 mL pen injector Inject 3 mg under the skin 1 (one) time per week. 2 mL 5    etodolac (Lodine) 400 mg tablet Take 1 tablet (400 mg) by mouth 2 times a day. 60 tablet 5    fluticasone (Flonase) 50 mcg/actuation nasal spray Administer 2 sprays into each nostril once daily. 16 g 5    lisinopril 20 mg tablet Take 1 tablet (20 mg) by mouth once daily. 30 tablet 5    montelukast (Singulair) 10 mg tablet Take 1 tablet (10 mg) by mouth once daily at bedtime. 30 tablet 5    sodium hyaluronate (Gelsyn-3) 16.8 mg/2 mL injection Inject 2 mL (16.8 mg) into the joint every 7 days. Inject one 16.8mg/2ml  prefilled syringe intra-articularly into the left knee once weekly for three weeks; Qty: 1 series, 3 injections 6 mL 0     No current facility-administered medications on file prior to visit.       PATIENT EDUCATION/GOALS  - Counseled patient on MOA, expectations, side effects, duration of therapy, contraindications, administration, and monitoring parameters  - Answered all patient questions and concerns; provided [facility] phone number if issues/questions arise    -We have discussed the off-label use of once-weekly Trulicity which is indicated and approved by the FDA for Type Two Diabetes Mellitus treatment. Medication cost and coverage for this agent may change at any time as determined by your primary insurance carrier.     RECOMMENDATIONS/PLAN  Problem List Items Addressed This Visit       Class 3 obesity with alveolar hypoventilation, serious comorbidity, and body mass index (BMI) of 60.0 to 69.9 in adult (Multi)         Weight Loss Plan: given continue weight loss and appetite suppression, will continue current dose.    Increase: Trulicity 3 mg once weekly    Clinical Pharmacist follow up: 9/25/2024 @ 9:00 AM  Type of Encounter: Virtual    Continue all meds under the continuation of care with the referring provider and clinical pharmacy team.    Johnny Billy, PharmD, BCACP  [phone]    Verbal consent to manage patient's drug therapy was obtained from patient. They were informed they may decline to participate or withdraw from participation in pharmacy services at any time.

## 2024-09-23 ENCOUNTER — TELEPHONE (OUTPATIENT)
Dept: PRIMARY CARE | Facility: CLINIC | Age: 54
End: 2024-09-23
Payer: COMMERCIAL

## 2024-09-24 ENCOUNTER — TELEPHONE (OUTPATIENT)
Dept: ORTHOPEDIC SURGERY | Facility: CLINIC | Age: 54
End: 2024-09-24
Payer: COMMERCIAL

## 2024-09-24 NOTE — TELEPHONE ENCOUNTER
Hey    This patient is calling to discuss his Left knee hinged brace. Can you please give him a call back?

## 2024-09-25 ENCOUNTER — APPOINTMENT (OUTPATIENT)
Dept: PHARMACY | Facility: HOSPITAL | Age: 54
End: 2024-09-25
Payer: COMMERCIAL

## 2024-09-25 DIAGNOSIS — E66.2 CLASS 3 OBESITY WITH ALVEOLAR HYPOVENTILATION, SERIOUS COMORBIDITY, AND BODY MASS INDEX (BMI) OF 60.0 TO 69.9 IN ADULT: ICD-10-CM

## 2024-09-25 NOTE — PROGRESS NOTES
Pharmacist Clinic: Weight Management    Neptali Estrada was referred to the Clinical Pharmacy Team for weight management.     Referring Provider: Lynne Magana MD    HISTORY OF PRESENT ILLNESS  Patient was referred to Clinical Pharmacy for weight loss management.     Patient denies side effects with Glp-1 therapy or low BG symptoms. Endorses continued weight loss. Patient desires to continue current dose and save dose increase for when weight loss slows down more.      Diet:   -Breakfast: eggs, toast  -Lunch: salad or sandwich with chips  -Dinner: chicken and vegetables, pasta, eats meal around 6pm  -Snacks: peanuts, cashews     Exercise Routine: per patient, unable to get much exercise with pain from knees. Patient states he needs a knee replacement, but needs to lose weight prior to surgery.    Additional Contributory Factors/Comorbidities:  Dyslipidemia, hypertension.    Weight   Per patient, home scale does not register over 400 lbs, will give error reading (no home weight prior to PCP visit)  423 lbs (initial -at PCP appointment)  399 lbs -5/8/2024   379 lbs -6/5/2024  362 lbs -7/1/2024  335 lbs -7/31/2024  354 lbs -8/5/2024 (office scale)  345 lbs -8/28/2024  331 lbs -9/25/2024    *Of note, patient bought a new scale prior to starting Trulicity, unclear why there is such a big difference between office and patient's home scale.     PHARMACY ASSESSMENT  Pertinent PMH Review:  - PMH of Pancreatitis: No  - PMH of Retinopathy: No  - PMH of MTC: No    Allergies: Patient has no known allergies.     [id number] - [place], [place] - [address]  [address] [facility] [address]  Phone: [phone] Fax: [phone]    [facility] - [address]  [address] [place] [address]  Phone: [phone] Fax: [phone]      Affordability/Accessibility:   -Trulicity: covered by insurance  -Wegovy: PA  -Zepbound: PA  -Ozempic: PA  -Mounjaro: PA    Adherence/Organization: no issues  per patient  Adverse Effects: none     CURRENT PHARMACOTHERAPY  - Trulicity 3 mg under the skin once weekly    ASCVD PREVENTION  -Statin? Yes, atorvastatin 10mg  -Aspirin? No    MEDICATION RECONCILIATION  No changes to medication record at this time. Patient able to confirm medications and directions.    DRUG INTERACTIONS  - No significant drug-drug interactions exist that require adjustment to therapy    LAB  Lab Results   Component Value Date    BILITOT 0.7 07/31/2024    CALCIUM 9.6 07/31/2024    CO2 26 07/31/2024     07/31/2024    CREATININE 0.95 07/31/2024    GLUCOSE 81 07/31/2024    ALKPHOS 57 07/31/2024    K 4.3 07/31/2024    PROT 7.0 07/31/2024     07/31/2024    AST 14 07/31/2024    ALT 15 07/31/2024    BUN 21 07/31/2024    ANIONGAP 12 07/31/2024    ALBUMIN 4.3 07/31/2024    EGFR >90 07/31/2024     Lab Results   Component Value Date    TRIG 90 07/31/2024    CHOL 126 07/31/2024    LDLCALC 68 07/31/2024    HDL 40.0 07/31/2024     Lab Results   Component Value Date    HGBA1C 4.7 07/31/2024       Current Outpatient Medications on File Prior to Visit   Medication Sig Dispense Refill    albuterol (Ventolin HFA) 90 mcg/actuation inhaler Inhale 2 puffs every 4 hours if needed for wheezing. 18 g 2    atorvastatin (Lipitor) 10 mg tablet Take 1 tablet (10 mg) by mouth once daily. 30 tablet 5    cetirizine (ZyrTEC) 10 mg tablet Take 1 tablet (10 mg) by mouth once daily. 30 tablet 5    dulaglutide (Trulicity) 3 mg/0.5 mL pen injector Inject 3 mg under the skin 1 (one) time per week. 2 mL 5    etodolac (Lodine) 400 mg tablet Take 1 tablet (400 mg) by mouth 2 times a day. 60 tablet 5    fluticasone (Flonase) 50 mcg/actuation nasal spray Administer 2 sprays into each nostril once daily. 16 g 5    lisinopril 20 mg tablet Take 1 tablet (20 mg) by mouth once daily. 30 tablet 5    montelukast (Singulair) 10 mg tablet Take 1 tablet (10 mg) by mouth once daily at bedtime. 30 tablet 5    sodium hyaluronate (Gelsyn-3)  16.8 mg/2 mL injection Inject 2 mL (16.8 mg) into the joint every 7 days. Inject one 16.8mg/2ml prefilled syringe intra-articularly into the left knee once weekly for three weeks; Qty: 1 series, 3 injections 6 mL 0     No current facility-administered medications on file prior to visit.       PATIENT EDUCATION/GOALS  - Counseled patient on MOA, expectations, side effects, duration of therapy, contraindications, administration, and monitoring parameters  - Answered all patient questions and concerns; provided [facility] phone number if issues/questions arise  -We have discussed the off-label use of once-weekly Trulicity which is indicated and approved by the FDA for Type Two Diabetes Mellitus treatment. Medication cost and coverage for this agent may change at any time as determined by your primary insurance carrier.     RECOMMENDATIONS/PLAN  Problem List Items Addressed This Visit       Class 3 obesity with alveolar hypoventilation, serious comorbidity, and body mass index (BMI) of 60.0 to 69.9 in adult (Multi)         Weight Loss Plan: will continue current dose/therapy given continued weight loss and patient desire to postpone dose increase.     Continue: Trulicity 3 mg once weekly    Clinical Pharmacist follow up: 10/23/2024 @ 9:30 AM  Type of Encounter: Virtual    Continue all meds under the continuation of care with the referring provider and clinical pharmacy team.    Johnny Billy, PharmD, [facility]  [phone]    Verbal consent to manage patient's drug therapy was obtained from patient. They were informed they may decline to participate or withdraw from participation in pharmacy services at any time.

## 2024-10-23 ENCOUNTER — APPOINTMENT (OUTPATIENT)
Dept: PHARMACY | Facility: HOSPITAL | Age: 54
End: 2024-10-23
Payer: COMMERCIAL

## 2024-10-23 DIAGNOSIS — E66.813 CLASS 3 OBESITY WITH ALVEOLAR HYPOVENTILATION, SERIOUS COMORBIDITY, AND BODY MASS INDEX (BMI) OF 60.0 TO 69.9 IN ADULT: ICD-10-CM

## 2024-10-23 DIAGNOSIS — E66.2 CLASS 3 OBESITY WITH ALVEOLAR HYPOVENTILATION, SERIOUS COMORBIDITY, AND BODY MASS INDEX (BMI) OF 60.0 TO 69.9 IN ADULT: ICD-10-CM

## 2024-10-23 NOTE — PROGRESS NOTES
Pharmacist Clinic: Weight Management    Neptali Estarda was referred to the Clinical Pharmacy Team for weight management.     Referring Provider: Lynne Magana MD    HISTORY OF PRESENT ILLNESS  Patient was referred to Clinical Pharmacy for weight loss management.     Patient denies side effects. Continuing with low carbohydrate diet and yoga for low mobility individuals. Patient is pleased with therapy at this time and would prefer to stay on the current dose.      Diet:   -Breakfast: eggs, toast  -Lunch: salad or sandwich with chips  -Dinner: chicken and vegetables, pasta, eats meal around 6pm  -Snacks: peanuts, cashews     Exercise Routine: per patient, unable to get much exercise with pain from knees. Patient states he needs a knee replacement, but needs to lose weight prior to surgery.    Additional Contributory Factors/Comorbidities:  Dyslipidemia, hypertension.    Weight   Per patient, home scale does not register over 400 lbs, will give error reading (no home weight prior to PCP visit)  423 lbs (initial -at PCP appointment)  399 lbs -5/8/2024   379 lbs -6/5/2024  362 lbs -7/1/2024  335 lbs -7/31/2024  354 lbs -8/5/2024 (office scale)  345 lbs -8/28/2024  331 lbs -9/25/2024  315 lbs -10/23/2024    *Of note, patient bought a new scale prior to starting Trulicity, unclear why there is such a big difference between office and patient's home scale.     PHARMACY ASSESSMENT  Pertinent PMH Review:  - PMH of Pancreatitis: No  - PMH of Retinopathy: No  - PMH of MTC: No    Allergies: Patient has no known allergies.     [id number] - [place], [place] - [address]  [address] [facility] [address]  Phone: [phone] Fax: [phone]    [address] [address]  Phone: [phone] Fax: [phone]      Affordability/Accessibility:   -Trulicity: covered by insurance  -Wegovy: PA  -Zepbound: PA  -Ozempic: PA  -Mounjaro:  PA    Adherence/Organization: no issues per patient  Adverse Effects: none     CURRENT PHARMACOTHERAPY  - Trulicity 3 mg under the skin once weekly    ASCVD PREVENTION  -Statin? Yes, atorvastatin 10mg  -Aspirin? No    MEDICATION RECONCILIATION  No changes to medication record at this time. Patient able to confirm medications and directions.    DRUG INTERACTIONS  - No significant drug-drug interactions exist that require adjustment to therapy    LAB  Lab Results   Component Value Date    BILITOT 0.7 07/31/2024    CALCIUM 9.6 07/31/2024    CO2 26 07/31/2024     07/31/2024    CREATININE 0.95 07/31/2024    GLUCOSE 81 07/31/2024    ALKPHOS 57 07/31/2024    K 4.3 07/31/2024    PROT 7.0 07/31/2024     07/31/2024    AST 14 07/31/2024    ALT 15 07/31/2024    BUN 21 07/31/2024    ANIONGAP 12 07/31/2024    ALBUMIN 4.3 07/31/2024    EGFR >90 07/31/2024     Lab Results   Component Value Date    TRIG 90 07/31/2024    CHOL 126 07/31/2024    LDLCALC 68 07/31/2024    HDL 40.0 07/31/2024     Lab Results   Component Value Date    HGBA1C 4.7 07/31/2024       Current Outpatient Medications on File Prior to Visit   Medication Sig Dispense Refill    albuterol (Ventolin HFA) 90 mcg/actuation inhaler Inhale 2 puffs every 4 hours if needed for wheezing. 18 g 2    atorvastatin (Lipitor) 10 mg tablet Take 1 tablet (10 mg) by mouth once daily. 30 tablet 5    cetirizine (ZyrTEC) 10 mg tablet Take 1 tablet (10 mg) by mouth once daily. 30 tablet 5    dulaglutide (Trulicity) 3 mg/0.5 mL pen injector Inject 3 mg under the skin 1 (one) time per week. 2 mL 5    etodolac (Lodine) 400 mg tablet Take 1 tablet (400 mg) by mouth 2 times a day. 60 tablet 5    fluticasone (Flonase) 50 mcg/actuation nasal spray Administer 2 sprays into each nostril once daily. 16 g 5    lisinopril 20 mg tablet Take 1 tablet (20 mg) by mouth once daily. 30 tablet 5    montelukast (Singulair) 10 mg tablet Take 1 tablet (10 mg) by mouth once daily at bedtime. 30  tablet 5    sodium hyaluronate (Gelsyn-3) 16.8 mg/2 mL injection Inject 2 mL (16.8 mg) into the joint every 7 days. Inject one 16.8mg/2ml prefilled syringe intra-articularly into the left knee once weekly for three weeks; Qty: 1 series, 3 injections 6 mL 0     No current facility-administered medications on file prior to visit.       PATIENT EDUCATION/GOALS  - Counseled patient on MOA, expectations, side effects, duration of therapy, contraindications, administration, and monitoring parameters  - Answered all patient questions and concerns; provided [facility] phone number if issues/questions arise  -We have discussed the off-label use of once-weekly Trulicity which is indicated and approved by the FDA for Type Two Diabetes Mellitus treatment. Medication cost and coverage for this agent may change at any time as determined by your primary insurance carrier.     RECOMMENDATIONS/PLAN  Problem List Items Addressed This Visit       Class 3 obesity with alveolar hypoventilation, serious comorbidity, and body mass index (BMI) of 60.0 to 69.9 in adult (Multi)         Weight Loss Plan: as weight loss has continued with current diet and exercise, will continue current Trulicity dose. Patient tolerating therapy without side effects.      Continue: Trulicity 3 mg once weekly    Clinical Pharmacist follow up: 11/20/2024 @ 9:00 AM  Type of Encounter: Virtual    Continue all meds under the continuation of care with the referring provider and clinical pharmacy team.    Johnny Billy, PharmD, [facility]  [phone]    Verbal consent to manage patient's drug therapy was obtained from patient. They were informed they may decline to participate or withdraw from participation in pharmacy services at any time.

## 2024-10-30 ENCOUNTER — LAB (OUTPATIENT)
Dept: LAB | Facility: LAB | Age: 54
End: 2024-10-30
Payer: COMMERCIAL

## 2024-10-30 DIAGNOSIS — E66.1 CLASS 3 DRUG-INDUCED OBESITY WITH SERIOUS COMORBIDITY AND BODY MASS INDEX (BMI) OF 50.0 TO 59.9 IN ADULT: ICD-10-CM

## 2024-10-30 DIAGNOSIS — E66.813 CLASS 3 DRUG-INDUCED OBESITY WITH SERIOUS COMORBIDITY AND BODY MASS INDEX (BMI) OF 50.0 TO 59.9 IN ADULT: ICD-10-CM

## 2024-10-30 DIAGNOSIS — E78.5 DYSLIPIDEMIA: ICD-10-CM

## 2024-10-30 DIAGNOSIS — I10 ESSENTIAL HYPERTENSION: ICD-10-CM

## 2024-10-30 LAB
ALBUMIN SERPL BCP-MCNC: 4.2 G/DL (ref 3.4–5)
ALP SERPL-CCNC: 59 U/L (ref 33–120)
ALT SERPL W P-5'-P-CCNC: 15 U/L (ref 10–52)
ANION GAP SERPL CALC-SCNC: 12 MMOL/L (ref 10–20)
AST SERPL W P-5'-P-CCNC: 14 U/L (ref 9–39)
BILIRUB SERPL-MCNC: 0.6 MG/DL (ref 0–1.2)
BUN SERPL-MCNC: 21 MG/DL (ref 6–23)
CALCIUM SERPL-MCNC: 9.5 MG/DL (ref 8.6–10.3)
CHLORIDE SERPL-SCNC: 104 MMOL/L (ref 98–107)
CHOLEST SERPL-MCNC: 123 MG/DL (ref 0–199)
CHOLESTEROL/HDL RATIO: 3
CO2 SERPL-SCNC: 29 MMOL/L (ref 21–32)
CREAT SERPL-MCNC: 0.98 MG/DL (ref 0.5–1.3)
EGFRCR SERPLBLD CKD-EPI 2021: >90 ML/MIN/1.73M*2
EST. AVERAGE GLUCOSE BLD GHB EST-MCNC: 85 MG/DL
GLUCOSE SERPL-MCNC: 75 MG/DL (ref 74–99)
HBA1C MFR BLD: 4.6 %
HDLC SERPL-MCNC: 40.8 MG/DL
LDLC SERPL CALC-MCNC: 65 MG/DL
NON HDL CHOLESTEROL: 82 MG/DL (ref 0–149)
POTASSIUM SERPL-SCNC: 4.5 MMOL/L (ref 3.5–5.3)
PROT SERPL-MCNC: 6.8 G/DL (ref 6.4–8.2)
SODIUM SERPL-SCNC: 140 MMOL/L (ref 136–145)
TRIGL SERPL-MCNC: 88 MG/DL (ref 0–149)
VLDL: 18 MG/DL (ref 0–40)

## 2024-10-30 PROCEDURE — 36415 COLL VENOUS BLD VENIPUNCTURE: CPT

## 2024-10-30 PROCEDURE — 83036 HEMOGLOBIN GLYCOSYLATED A1C: CPT

## 2024-10-30 PROCEDURE — 80061 LIPID PANEL: CPT

## 2024-10-30 PROCEDURE — 80053 COMPREHEN METABOLIC PANEL: CPT

## 2024-11-05 ENCOUNTER — APPOINTMENT (OUTPATIENT)
Dept: PRIMARY CARE | Facility: CLINIC | Age: 54
End: 2024-11-05
Payer: COMMERCIAL

## 2024-11-05 VITALS
WEIGHT: 315 LBS | OXYGEN SATURATION: 97 % | HEART RATE: 84 BPM | HEIGHT: 70 IN | DIASTOLIC BLOOD PRESSURE: 76 MMHG | SYSTOLIC BLOOD PRESSURE: 124 MMHG | BODY MASS INDEX: 45.1 KG/M2 | TEMPERATURE: 97.9 F

## 2024-11-05 DIAGNOSIS — E78.5 DYSLIPIDEMIA: ICD-10-CM

## 2024-11-05 DIAGNOSIS — E66.01 MORBID OBESITY (MULTI): ICD-10-CM

## 2024-11-05 DIAGNOSIS — Z12.11 COLON CANCER SCREENING: Primary | ICD-10-CM

## 2024-11-05 DIAGNOSIS — I10 ESSENTIAL HYPERTENSION: ICD-10-CM

## 2024-11-05 DIAGNOSIS — M17.10 ARTHRITIS OF KNEE: ICD-10-CM

## 2024-11-05 PROCEDURE — 3008F BODY MASS INDEX DOCD: CPT | Performed by: INTERNAL MEDICINE

## 2024-11-05 PROCEDURE — 3078F DIAST BP <80 MM HG: CPT | Performed by: INTERNAL MEDICINE

## 2024-11-05 PROCEDURE — 1036F TOBACCO NON-USER: CPT | Performed by: INTERNAL MEDICINE

## 2024-11-05 PROCEDURE — 99214 OFFICE O/P EST MOD 30 MIN: CPT | Performed by: INTERNAL MEDICINE

## 2024-11-05 PROCEDURE — 3074F SYST BP LT 130 MM HG: CPT | Performed by: INTERNAL MEDICINE

## 2024-11-05 ASSESSMENT — PATIENT HEALTH QUESTIONNAIRE - PHQ9
SUM OF ALL RESPONSES TO PHQ9 QUESTIONS 1 AND 2: 0
1. LITTLE INTEREST OR PLEASURE IN DOING THINGS: NOT AT ALL
2. FEELING DOWN, DEPRESSED OR HOPELESS: NOT AT ALL

## 2024-11-05 ASSESSMENT — ENCOUNTER SYMPTOMS
LOSS OF SENSATION IN FEET: 0
OCCASIONAL FEELINGS OF UNSTEADINESS: 1
DEPRESSION: 0

## 2024-11-05 NOTE — PROGRESS NOTES
"Subjective   Patient ID: Neptali Estrada is a 54 y.o. male who presents for Follow-up (EP.  Follow up cholesterol.  Labs done.  No concerns.).    HPI    Here for for fu   Has been losing weight and has lost 117  pounds on his own scale and about 80+ pounds on our scale and is in trulicity  3 mg daily    Has been doing  exercise program  at home for people w low mobility   Working  on getting a knee brace from [facility]    Needs referral for gi needs colonoscopy   Discussed exercise programs   We talked about options for upper body cardio such as rowing machines and recumbent bikes  I encouraged him to follow-up with the orthopedic doctor regarding getting a hinged brace so he can exercise more  He denies chest pains headaches dizziness lightheadedness or shortness of breath no lower extremity edema he has been feeling well    Review of Systems  Review of systems was performed and is otherwise negative except as noted in HPI.      Objective   /76   Pulse 84   Temp 36.6 °C (97.9 °F) (Oral)   Ht 1.778 m (5' 10\")   Wt 149 kg (329 lb)   SpO2 97%   BMI 47.21 kg/m²      Physical Exam  HEENT is normal  Lungs clear bilaterally  Heart is regular rate rhythm no murmurs  Abdomen benign  Lower extremities no edema     Assessment/Plan   Diagnoses and all orders for this visit:  Colon cancer screening  -     Referral to Gastroenterology; Future  Dyslipidemia  -     Hemoglobin A1C; Future  -     Lipid Panel; Future  -     Comprehensive Metabolic Panel; Future  Essential hypertension  -     Hemoglobin A1C; Future  -     Lipid Panel; Future  -     Comprehensive Metabolic Panel; Future  Arthritis of knee  Morbid obesity (Multi)  -     Hemoglobin A1C; Future  -     Lipid Panel; Future  -     Comprehensive Metabolic Panel; Future    Call with issues  Blood work ordered  Blood work reviewed  Refill not needed yet  Follow-up with me in 3 months    Lynne Magana MD  "

## 2024-11-20 ENCOUNTER — APPOINTMENT (OUTPATIENT)
Dept: PHARMACY | Facility: HOSPITAL | Age: 54
End: 2024-11-20
Payer: COMMERCIAL

## 2024-11-20 DIAGNOSIS — E66.813 CLASS 3 OBESITY WITH ALVEOLAR HYPOVENTILATION, SERIOUS COMORBIDITY, AND BODY MASS INDEX (BMI) OF 60.0 TO 69.9 IN ADULT: ICD-10-CM

## 2024-11-20 DIAGNOSIS — E66.2 CLASS 3 OBESITY WITH ALVEOLAR HYPOVENTILATION, SERIOUS COMORBIDITY, AND BODY MASS INDEX (BMI) OF 60.0 TO 69.9 IN ADULT: ICD-10-CM

## 2024-11-20 RX ORDER — DULAGLUTIDE 3 MG/.5ML
3 INJECTION, SOLUTION SUBCUTANEOUS
Qty: 2 ML | Refills: 5 | Status: SHIPPED | OUTPATIENT
Start: 2024-11-24

## 2024-11-20 NOTE — PROGRESS NOTES
Pharmacist Clinic: Weight Management    Neptali Estrada was referred to the Clinical Pharmacy Team for weight management.     Referring Provider: Lynne Magana MD    HISTORY OF PRESENT ILLNESS  Patient was referred to Clinical Pharmacy for weight loss management.     Patient denies side effects. Continuing with diet and exercise. Wants to reconnect after the holidays and consider dose increase. Planning to get injections to help his knees so he can use his recumbant bike.      Diet:   -Breakfast: eggs, toast  -Lunch: salad or sandwich with chips  -Dinner: chicken and vegetables, pasta, eats meal around 6pm  -Snacks: peanuts, cashews     Exercise Routine: per patient, unable to get much exercise with pain from knees. Patient states he needs a knee replacement, but needs to lose weight prior to surgery.    Additional Contributory Factors/Comorbidities:  Dyslipidemia, hypertension.    Weight   Per patient, home scale does not register over 400 lbs, will give error reading (no home weight prior to PCP visit)  423 lbs (initial -at PCP appointment)  399 lbs -5/8/2024   379 lbs -6/5/2024  362 lbs -7/1/2024  335 lbs -7/31/2024  354 lbs -8/5/2024 (office scale)  345 lbs -8/28/2024  331 lbs -9/25/2024  315 lbs -10/23/2024  304 lbs -11/20/2024    *Of note, patient bought a new scale prior to starting Trulicity, unclear why there is such a big difference between office and patient's home scale.     PHARMACY ASSESSMENT  Pertinent PMH Review:  - PMH of Pancreatitis: No  - PMH of Retinopathy: No  - PMH of MTC: No    Allergies: Patient has no known allergies.     [id number] - [place], [place] - [address]  [address]  [facility] [address]  Phone: [phone] Fax: [phone]    [address] [address]  Phone: [phone] Fax: [phone]      Affordability/Accessibility:   -Trulicity: covered by insurance  -Wegovy: PA  -Zepbound: PA  -Ozempic:  PA  -Mounjaro: PA    Adherence/Organization: no issues per patient  Adverse Effects: none     CURRENT PHARMACOTHERAPY  - Trulicity 3 mg under the skin once weekly    ASCVD PREVENTION  -Statin? Yes, atorvastatin 10mg  -Aspirin? No    MEDICATION RECONCILIATION  No changes to medication record at this time. Patient able to confirm medications and directions.    DRUG INTERACTIONS  - No significant drug-drug interactions exist that require adjustment to therapy    LAB  Lab Results   Component Value Date    BILITOT 0.6 10/30/2024    CALCIUM 9.5 10/30/2024    CO2 29 10/30/2024     10/30/2024    CREATININE 0.98 10/30/2024    GLUCOSE 75 10/30/2024    ALKPHOS 59 10/30/2024    K 4.5 10/30/2024    PROT 6.8 10/30/2024     10/30/2024    AST 14 10/30/2024    ALT 15 10/30/2024    BUN 21 10/30/2024    ANIONGAP 12 10/30/2024    ALBUMIN 4.2 10/30/2024    EGFR >90 10/30/2024     Lab Results   Component Value Date    TRIG 88 10/30/2024    CHOL 123 10/30/2024    LDLCALC 65 10/30/2024    HDL 40.8 10/30/2024     Lab Results   Component Value Date    HGBA1C 4.6 10/30/2024       Current Outpatient Medications on File Prior to Visit   Medication Sig Dispense Refill    albuterol (Ventolin HFA) 90 mcg/actuation inhaler Inhale 2 puffs every 4 hours if needed for wheezing. 18 g 2    atorvastatin (Lipitor) 10 mg tablet Take 1 tablet (10 mg) by mouth once daily. 30 tablet 5    cetirizine (ZyrTEC) 10 mg tablet Take 1 tablet (10 mg) by mouth once daily. 30 tablet 5    dulaglutide (Trulicity) 3 mg/0.5 mL pen injector Inject 3 mg under the skin 1 (one) time per week. 2 mL 5    etodolac (Lodine) 400 mg tablet Take 1 tablet (400 mg) by mouth 2 times a day. 60 tablet 5    fluticasone (Flonase) 50 mcg/actuation nasal spray Administer 2 sprays into each nostril once daily. 16 g 5    lisinopril 20 mg tablet Take 1 tablet (20 mg) by mouth once daily. 30 tablet 5    montelukast (Singulair) 10 mg tablet Take 1 tablet (10 mg) by mouth once daily at  bedtime. 30 tablet 5    sodium hyaluronate (Gelsyn-3) 16.8 mg/2 mL injection Inject 2 mL (16.8 mg) into the joint every 7 days. Inject one 16.8mg/2ml prefilled syringe intra-articularly into the left knee once weekly for three weeks; Qty: 1 series, 3 injections 6 mL 0     No current facility-administered medications on file prior to visit.       PATIENT EDUCATION/GOALS  - Counseled patient on MOA, expectations, side effects, duration of therapy, contraindications, administration, and monitoring parameters  - Answered all patient questions and concerns; provided [facility] phone number if issues/questions arise  -We have discussed the off-label use of once-weekly Trulicity which is indicated and approved by the FDA for Type Two Diabetes Mellitus treatment. Medication cost and coverage for this agent may change at any time as determined by your primary insurance carrier.     RECOMMENDATIONS/PLAN  Problem List Items Addressed This Visit       Class 3 obesity with alveolar hypoventilation, serious comorbidity, and body mass index (BMI) of 60.0 to 69.9 in adult (Multi)         Weight Loss Plan: successful weight loss has continued on current dose. Will schedule follow-up in the new year to consider a dose increase.     Continue: Trulicity 3 mg once weekly    Clinical Pharmacist follow up: 1/15/2025 @ 9:00 AM  Type of Encounter: Virtual    Continue all meds under the continuation of care with the referring provider and clinical pharmacy team.    Johnny Billy, PharmD, [facility]  [phone]    Verbal consent to manage patient's drug therapy was obtained from patient. They were informed they may decline to participate or withdraw from participation in pharmacy services at any time.

## 2024-12-06 DIAGNOSIS — M17.12 ARTHRITIS OF KNEE, LEFT: ICD-10-CM

## 2024-12-06 RX ORDER — HYALURONATE SODIUM 16.8MG/2ML
16.8 SYRINGE (ML) INTRAARTICULAR
Qty: 6 ML | Refills: 0 | Status: SHIPPED | OUTPATIENT
Start: 2024-12-06 | End: 2024-12-21

## 2024-12-30 ENCOUNTER — OFFICE VISIT (OUTPATIENT)
Dept: ORTHOPEDIC SURGERY | Facility: CLINIC | Age: 54
End: 2024-12-30
Payer: COMMERCIAL

## 2024-12-30 VITALS — HEIGHT: 70 IN | BODY MASS INDEX: 45.1 KG/M2 | WEIGHT: 315 LBS

## 2024-12-30 DIAGNOSIS — M17.12 ARTHRITIS OF KNEE, LEFT: Primary | ICD-10-CM

## 2024-12-30 PROCEDURE — 99211 OFF/OP EST MAY X REQ PHY/QHP: CPT | Mod: 25

## 2024-12-30 PROCEDURE — 1036F TOBACCO NON-USER: CPT

## 2024-12-30 PROCEDURE — 2500000004 HC RX 250 GENERAL PHARMACY W/ HCPCS (ALT 636 FOR OP/ED): Mod: JZ

## 2024-12-30 PROCEDURE — 3008F BODY MASS INDEX DOCD: CPT

## 2024-12-30 PROCEDURE — 20610 DRAIN/INJ JOINT/BURSA W/O US: CPT | Mod: LT

## 2024-12-30 RX ADMIN — Medication 16.8 MG: at 08:16

## 2024-12-30 NOTE — PROGRESS NOTES
Subjective    Patient ID: Neptali Estrada is a 54 y.o. male.    Chief Complaint: Injections (GELSYN-3 16.8MG/2ML INJ #1 LT KNEE/PT SUPPLIED/)    L Inj/Asp: L knee on 12/30/2024 8:16 AM  Indications: pain (Arthritis)  Details: 22 G needle, superolateral approach  Medications: 16.8 mg sodium hyaluronate 16.8 mg/2 mL  Procedure, treatment alternatives, risks and benefits explained, specific risks discussed. Consent was given by the patient.          Assessment/Plan   Encounter Diagnoses: left knee arthritis

## 2025-01-06 ENCOUNTER — OFFICE VISIT (OUTPATIENT)
Dept: ORTHOPEDIC SURGERY | Facility: CLINIC | Age: 55
End: 2025-01-06
Payer: COMMERCIAL

## 2025-01-06 VITALS — HEIGHT: 70 IN | WEIGHT: 304 LBS | BODY MASS INDEX: 43.52 KG/M2

## 2025-01-06 DIAGNOSIS — M17.12 ARTHRITIS OF KNEE, LEFT: Primary | ICD-10-CM

## 2025-01-06 PROCEDURE — 1036F TOBACCO NON-USER: CPT

## 2025-01-06 PROCEDURE — 2500000004 HC RX 250 GENERAL PHARMACY W/ HCPCS (ALT 636 FOR OP/ED): Mod: JZ

## 2025-01-06 PROCEDURE — 20610 DRAIN/INJ JOINT/BURSA W/O US: CPT | Mod: RT

## 2025-01-06 PROCEDURE — 3008F BODY MASS INDEX DOCD: CPT

## 2025-01-06 PROCEDURE — 99211 OFF/OP EST MAY X REQ PHY/QHP: CPT | Mod: 25

## 2025-01-06 RX ADMIN — Medication 16.8 MG: at 08:13

## 2025-01-06 NOTE — PROGRESS NOTES
Subjective    Patient ID: Neptali Estrada is a 54 y.o. male.    Chief Complaint: Injections (GELSYN-3 16.8MG/2ML INJ #2 LT KNEE/PT SUPPLIED/)     L Inj/Asp: R knee on 1/6/2025 8:13 AM  Indications: pain (Arthritis )  Details: 22 G needle, superolateral approach  Medications: 16.8 mg sodium hyaluronate 16.8 mg/2 mL  Procedure, treatment alternatives, risks and benefits explained, specific risks discussed. Consent was given by the patient.          Assessment/Plan   Encounter Diagnoses:  Arthritis of knee, left

## 2025-01-13 ENCOUNTER — OFFICE VISIT (OUTPATIENT)
Dept: ORTHOPEDIC SURGERY | Facility: CLINIC | Age: 55
End: 2025-01-13
Payer: COMMERCIAL

## 2025-01-13 DIAGNOSIS — M17.12 ARTHRITIS OF KNEE, LEFT: Primary | ICD-10-CM

## 2025-01-13 PROCEDURE — 1036F TOBACCO NON-USER: CPT

## 2025-01-13 PROCEDURE — 99211 OFF/OP EST MAY X REQ PHY/QHP: CPT | Mod: 25

## 2025-01-13 PROCEDURE — 2500000004 HC RX 250 GENERAL PHARMACY W/ HCPCS (ALT 636 FOR OP/ED): Mod: JZ

## 2025-01-13 PROCEDURE — 20610 DRAIN/INJ JOINT/BURSA W/O US: CPT | Mod: LT

## 2025-01-13 RX ADMIN — Medication 16.8 MG: at 08:17

## 2025-01-13 NOTE — PROGRESS NOTES
Subjective    Patient ID: Neptali Estrada is a 54 y.o. male.    Chief Complaint: OTHER (GELSYN-3 INJECTION 16.8MG/2ML/#3 LT KNEE/PATIENT SUPPLIED MEDICATION)    L Inj/Asp: L knee on 1/13/2025 8:17 AM  Indications: pain (Arthritis)  Details: 22 G needle, superolateral approach  Medications: 16.8 mg sodium hyaluronate 16.8 mg/2 mL  Procedure, treatment alternatives, risks and benefits explained, specific risks discussed. Consent was given by the patient.          Assessment/Plan   Encounter Diagnoses:  Arthritis of knee, left    Patient was also provided a referral for a left knee economy hinge brace to obtain at [facility] or [facility]. Spoke with Magdalene, she believes outside referral would be best. He will follow up as needed.     Orders Placed This Encounter    Miscellaneous DME

## 2025-01-15 ENCOUNTER — APPOINTMENT (OUTPATIENT)
Dept: PHARMACY | Facility: HOSPITAL | Age: 55
End: 2025-01-15
Payer: COMMERCIAL

## 2025-01-15 DIAGNOSIS — E66.813 CLASS 3 OBESITY WITH ALVEOLAR HYPOVENTILATION, SERIOUS COMORBIDITY, AND BODY MASS INDEX (BMI) OF 60.0 TO 69.9 IN ADULT: ICD-10-CM

## 2025-01-15 DIAGNOSIS — E66.2 CLASS 3 OBESITY WITH ALVEOLAR HYPOVENTILATION, SERIOUS COMORBIDITY, AND BODY MASS INDEX (BMI) OF 60.0 TO 69.9 IN ADULT: ICD-10-CM

## 2025-01-15 RX ORDER — DULAGLUTIDE 4.5 MG/.5ML
4.5 INJECTION, SOLUTION SUBCUTANEOUS
Qty: 6 ML | Refills: 1 | Status: SHIPPED | OUTPATIENT
Start: 2025-01-19

## 2025-01-15 NOTE — PROGRESS NOTES
Pharmacist Clinic: Weight Management    Neptali Estrada was referred to the Clinical Pharmacy Team for weight management.     Referring Provider: Lynne Magana MD    HISTORY OF PRESENT ILLNESS  Patient was referred to Clinical Pharmacy for weight loss management.     Patient reports eating some sweets over the holidays but still able to achieve some weight loss. Denies side effects.      Diet:   -Breakfast: eggs, toast  -Lunch: salad or sandwich with chips  -Dinner: chicken and vegetables, pasta, eats meal around 6pm  -Snacks: peanuts, cashews     Exercise Routine: per patient, unable to get much exercise with pain from knees. Patient states he needs a knee replacement, but needs to lose weight prior to surgery.    Additional Contributory Factors/Comorbidities:  Dyslipidemia, hypertension.    Weight   Per patient, home scale does not register over 400 lbs, will give error reading (no home weight prior to PCP visit)  423 lbs (initial -at PCP appointment)  399 lbs -5/8/2024   379 lbs -6/5/2024  362 lbs -7/1/2024  335 lbs -7/31/2024  354 lbs -8/5/2024 (office scale)  345 lbs -8/28/2024  331 lbs -9/25/2024  315 lbs -10/23/2024  304 lbs -11/20/2024  298 lbs - 1/15/2025    *Of note, patient bought a new scale prior to starting Trulicity, unclear why there is such a big difference between office and patient's home scale.     PHARMACY ASSESSMENT  Pertinent PMH Review:  - PMH of Pancreatitis: No  - PMH of Retinopathy: No  - PMH of MTC: No    Allergies: Patient has no known allergies.     [id number] - [place], [place] - [address]  [address]  [facility] [address]  Phone: [phone] Fax: [phone]    [address] [address]  Phone: [phone] Fax: [phone]      Affordability/Accessibility:   -Trulicity: covered by insurance  -Wegovy: PA  -Zepbound: PA  -Ozempic: PA  -Mounjaro: PA    Adherence/Organization: no issues per patient  Adverse  Effects: none     CURRENT PHARMACOTHERAPY  - Trulicity 3 mg under the skin once weekly    ASCVD PREVENTION  -Statin? Yes, atorvastatin 10mg  -Aspirin? No    MEDICATION RECONCILIATION  No changes to medication record at this time. Patient able to confirm medications and directions.    DRUG INTERACTIONS  - No significant drug-drug interactions exist that require adjustment to therapy    LAB  Lab Results   Component Value Date    BILITOT 0.6 10/30/2024    CALCIUM 9.5 10/30/2024    CO2 29 10/30/2024     10/30/2024    CREATININE 0.98 10/30/2024    GLUCOSE 75 10/30/2024    ALKPHOS 59 10/30/2024    K 4.5 10/30/2024    PROT 6.8 10/30/2024     10/30/2024    AST 14 10/30/2024    ALT 15 10/30/2024    BUN 21 10/30/2024    ANIONGAP 12 10/30/2024    ALBUMIN 4.2 10/30/2024    EGFR >90 10/30/2024     Lab Results   Component Value Date    TRIG 88 10/30/2024    CHOL 123 10/30/2024    LDLCALC 65 10/30/2024    HDL 40.8 10/30/2024     Lab Results   Component Value Date    HGBA1C 4.6 10/30/2024       Current Outpatient Medications on File Prior to Visit   Medication Sig Dispense Refill    albuterol (Ventolin HFA) 90 mcg/actuation inhaler Inhale 2 puffs every 4 hours if needed for wheezing. 18 g 2    atorvastatin (Lipitor) 10 mg tablet Take 1 tablet (10 mg) by mouth once daily. 30 tablet 5    cetirizine (ZyrTEC) 10 mg tablet Take 1 tablet (10 mg) by mouth once daily. 30 tablet 5    dulaglutide (Trulicity) 3 mg/0.5 mL pen injector Inject 3 mg under the skin 1 (one) time per week. 2 mL 5    etodolac (Lodine) 400 mg tablet Take 1 tablet (400 mg) by mouth 2 times a day. 60 tablet 5    fluticasone (Flonase) 50 mcg/actuation nasal spray Administer 2 sprays into each nostril once daily. 16 g 5    lisinopril 20 mg tablet Take 1 tablet (20 mg) by mouth once daily. 30 tablet 5    montelukast (Singulair) 10 mg tablet Take 1 tablet (10 mg) by mouth once daily at bedtime. 30 tablet 5    sodium hyaluronate (Gelsyn-3) 16.8 mg/2 mL injection  Inject 2 mL (16.8 mg) into the joint every 7 days. Inject one 16.8mg/2ml prefilled syringe intra-articularly into the left knee once weekly for three weeks; Qty: 1 series, 3 injections 6 mL 0     No current facility-administered medications on file prior to visit.       PATIENT EDUCATION/GOALS  - Counseled patient on MOA, expectations, side effects, duration of therapy, contraindications, administration, and monitoring parameters  - Answered all patient questions and concerns; provided [facility] phone number if issues/questions arise  -We have discussed the off-label use of once-weekly Trulicity which is indicated and approved by the FDA for Type Two Diabetes Mellitus treatment. Medication cost and coverage for this agent may change at any time as determined by your primary insurance carrier.     RECOMMENDATIONS/PLAN  Problem List Items Addressed This Visit       Class 3 obesity with alveolar hypoventilation, serious comorbidity, and body mass index (BMI) of 60.0 to 69.9 in adult (Multi)         Weight Loss Plan: as the patient achieved weight loss, but amount was diminished compared to previous months, will further increase to maximize therapy. Good tolerance.   Increase: Trulicity 4.5 mg once weekly    Clinical Pharmacist follow up: 4/9/2025 @ 9:00 AM  Type of Encounter: Virtual    Continue all meds under the continuation of care with the referring provider and clinical pharmacy team.    oJhnny Billy, PharmD, [facility]  [phone]    Verbal consent to manage patient's drug therapy was obtained from patient. They were informed they may decline to participate or withdraw from participation in pharmacy services at any time.

## 2025-01-23 ENCOUNTER — LAB (OUTPATIENT)
Dept: LAB | Facility: LAB | Age: 55
End: 2025-01-23
Payer: COMMERCIAL

## 2025-01-23 DIAGNOSIS — E78.5 DYSLIPIDEMIA: ICD-10-CM

## 2025-01-23 DIAGNOSIS — I10 ESSENTIAL HYPERTENSION: ICD-10-CM

## 2025-01-23 DIAGNOSIS — E66.01 MORBID OBESITY (MULTI): ICD-10-CM

## 2025-01-23 LAB
ALBUMIN SERPL BCP-MCNC: 4.3 G/DL (ref 3.4–5)
ALP SERPL-CCNC: 54 U/L (ref 33–120)
ALT SERPL W P-5'-P-CCNC: 17 U/L (ref 10–52)
ANION GAP SERPL CALC-SCNC: 12 MMOL/L (ref 10–20)
AST SERPL W P-5'-P-CCNC: 16 U/L (ref 9–39)
BILIRUB SERPL-MCNC: 0.5 MG/DL (ref 0–1.2)
BUN SERPL-MCNC: 29 MG/DL (ref 6–23)
CALCIUM SERPL-MCNC: 9.3 MG/DL (ref 8.6–10.3)
CHLORIDE SERPL-SCNC: 104 MMOL/L (ref 98–107)
CHOLEST SERPL-MCNC: 121 MG/DL (ref 0–199)
CHOLESTEROL/HDL RATIO: 2.5
CO2 SERPL-SCNC: 28 MMOL/L (ref 21–32)
CREAT SERPL-MCNC: 1 MG/DL (ref 0.5–1.3)
EGFRCR SERPLBLD CKD-EPI 2021: 89 ML/MIN/1.73M*2
GLUCOSE SERPL-MCNC: 77 MG/DL (ref 74–99)
HDLC SERPL-MCNC: 47.7 MG/DL
LDLC SERPL CALC-MCNC: 56 MG/DL
NON HDL CHOLESTEROL: 73 MG/DL (ref 0–149)
POTASSIUM SERPL-SCNC: 4.6 MMOL/L (ref 3.5–5.3)
PROT SERPL-MCNC: 6.8 G/DL (ref 6.4–8.2)
SODIUM SERPL-SCNC: 139 MMOL/L (ref 136–145)
TRIGL SERPL-MCNC: 89 MG/DL (ref 0–149)
VLDL: 18 MG/DL (ref 0–40)

## 2025-01-23 PROCEDURE — 80061 LIPID PANEL: CPT

## 2025-01-23 PROCEDURE — 83036 HEMOGLOBIN GLYCOSYLATED A1C: CPT

## 2025-01-23 PROCEDURE — 80053 COMPREHEN METABOLIC PANEL: CPT

## 2025-01-24 LAB
EST. AVERAGE GLUCOSE BLD GHB EST-MCNC: 85 MG/DL
HBA1C MFR BLD: 4.6 %

## 2025-01-28 ENCOUNTER — APPOINTMENT (OUTPATIENT)
Dept: PRIMARY CARE | Facility: CLINIC | Age: 55
End: 2025-01-28
Payer: COMMERCIAL

## 2025-01-28 VITALS
HEIGHT: 70 IN | WEIGHT: 312 LBS | DIASTOLIC BLOOD PRESSURE: 74 MMHG | TEMPERATURE: 97.6 F | OXYGEN SATURATION: 97 % | BODY MASS INDEX: 44.67 KG/M2 | SYSTOLIC BLOOD PRESSURE: 128 MMHG | HEART RATE: 88 BPM

## 2025-01-28 DIAGNOSIS — I10 ESSENTIAL HYPERTENSION: ICD-10-CM

## 2025-01-28 DIAGNOSIS — E78.5 DYSLIPIDEMIA: ICD-10-CM

## 2025-01-28 DIAGNOSIS — E66.813 CLASS 3 OBESITY WITH ALVEOLAR HYPOVENTILATION, SERIOUS COMORBIDITY, AND BODY MASS INDEX (BMI) OF 60.0 TO 69.9 IN ADULT: ICD-10-CM

## 2025-01-28 DIAGNOSIS — J45.909 ASTHMA, UNSPECIFIED ASTHMA SEVERITY, UNSPECIFIED WHETHER COMPLICATED, UNSPECIFIED WHETHER PERSISTENT (HHS-HCC): ICD-10-CM

## 2025-01-28 DIAGNOSIS — Z12.5 SCREENING FOR PROSTATE CANCER: Primary | ICD-10-CM

## 2025-01-28 DIAGNOSIS — M25.59 PAIN IN OTHER JOINT: ICD-10-CM

## 2025-01-28 DIAGNOSIS — J45.909 REACTIVE AIRWAY DISEASE WITHOUT COMPLICATION, UNSPECIFIED ASTHMA SEVERITY, UNSPECIFIED WHETHER PERSISTENT (HHS-HCC): ICD-10-CM

## 2025-01-28 DIAGNOSIS — E66.2 CLASS 3 OBESITY WITH ALVEOLAR HYPOVENTILATION, SERIOUS COMORBIDITY, AND BODY MASS INDEX (BMI) OF 60.0 TO 69.9 IN ADULT: ICD-10-CM

## 2025-01-28 PROCEDURE — 3074F SYST BP LT 130 MM HG: CPT | Performed by: INTERNAL MEDICINE

## 2025-01-28 PROCEDURE — 3078F DIAST BP <80 MM HG: CPT | Performed by: INTERNAL MEDICINE

## 2025-01-28 PROCEDURE — 3008F BODY MASS INDEX DOCD: CPT | Performed by: INTERNAL MEDICINE

## 2025-01-28 PROCEDURE — 99214 OFFICE O/P EST MOD 30 MIN: CPT | Performed by: INTERNAL MEDICINE

## 2025-01-28 PROCEDURE — 1036F TOBACCO NON-USER: CPT | Performed by: INTERNAL MEDICINE

## 2025-01-28 RX ORDER — CETIRIZINE HYDROCHLORIDE 10 MG/1
10 TABLET ORAL DAILY
Qty: 30 TABLET | Refills: 5 | Status: SHIPPED | OUTPATIENT
Start: 2025-01-28

## 2025-01-28 RX ORDER — ALBUTEROL SULFATE 90 UG/1
2 INHALANT RESPIRATORY (INHALATION) EVERY 4 HOURS PRN
Qty: 18 G | Refills: 2 | Status: SHIPPED | OUTPATIENT
Start: 2025-01-28

## 2025-01-28 RX ORDER — LISINOPRIL 20 MG/1
20 TABLET ORAL DAILY
Qty: 30 TABLET | Refills: 5 | Status: SHIPPED | OUTPATIENT
Start: 2025-01-28

## 2025-01-28 RX ORDER — MONTELUKAST SODIUM 10 MG/1
10 TABLET ORAL NIGHTLY
Qty: 30 TABLET | Refills: 5 | Status: SHIPPED | OUTPATIENT
Start: 2025-01-28

## 2025-01-28 RX ORDER — FLUTICASONE PROPIONATE 50 MCG
2 SPRAY, SUSPENSION (ML) NASAL
Qty: 16 G | Refills: 5 | Status: SHIPPED | OUTPATIENT
Start: 2025-01-28

## 2025-01-28 RX ORDER — ATORVASTATIN CALCIUM 10 MG/1
10 TABLET, FILM COATED ORAL DAILY
Qty: 30 TABLET | Refills: 5 | Status: SHIPPED | OUTPATIENT
Start: 2025-01-28 | End: 2025-07-27

## 2025-01-28 RX ORDER — ETODOLAC 400 MG/1
400 TABLET, FILM COATED ORAL 2 TIMES DAILY
Qty: 60 TABLET | Refills: 5 | Status: SHIPPED | OUTPATIENT
Start: 2025-01-28

## 2025-01-28 ASSESSMENT — ENCOUNTER SYMPTOMS
OCCASIONAL FEELINGS OF UNSTEADINESS: 0
DEPRESSION: 0
LOSS OF SENSATION IN FEET: 0

## 2025-01-28 NOTE — PROGRESS NOTES
"Subjective   Patient ID: Neptali Estrada is a 54 y.o. male who presents for Follow-up (EP.  Follow up HTN.  Labs done.  Check nose, throat and ears.  Was in ER for sinus infection finished antibiotic last Wednesday.).  HPI  Here for fu htn  hyperlipidemia and on trulicity for weight and has lost more than 100 pounds  Gets knee gel injections   Was seen in ER a few weeks ago and had sinus sxs   Got 10 days amoxil and improved but still increased congestion and colored discharge foul and discolored in AM clinically resolved no fevers chills  No fever no st no headaches  Cough on and off  No issues w meds    Denies  cp  sob headaches  light headedness   Feels well  overall   Works w a he has no side effects from the medications pc pharmacy and increased Trulicity to 4.5     Review of Systems  Review of systems was performed and is otherwise negative except as noted in HPI.      Objective   /74   Pulse 88   Temp 36.4 °C (97.6 °F) (Oral)   Ht 1.778 m (5' 10\")   Wt 142 kg (312 lb)   SpO2 97%   BMI 44.77 kg/m²      Physical Exam  HEENT slight red nasal mucosa TMs are normal oropharynx normal neck supple lymphadenopathy  Lungs clear bilaterally  Heart is regular rate rhythm no murmurs  Abdomen benign  Lower extremities no edema     Assessment/Plan   Diagnoses and all orders for this visit:  Screening for prostate cancer  -     Prostate Specific Antigen, Screen; Future  Reactive airway disease without complication, unspecified asthma severity, unspecified whether persistent (HHS-HCC)  -     albuterol (Ventolin HFA) 90 mcg/actuation inhaler; Inhale 2 puffs every 4 hours if needed for wheezing.  Dyslipidemia  -     atorvastatin (Lipitor) 10 mg tablet; Take 1 tablet (10 mg) by mouth once daily.  -     Comprehensive Metabolic Panel; Future  Asthma, unspecified asthma severity, unspecified whether complicated, unspecified whether persistent (HHS-HCC)  -     cetirizine (ZyrTEC) 10 mg tablet; Take 1 tablet (10 mg) by " mouth once daily.  -     fluticasone (Flonase) 50 mcg/actuation nasal spray; Administer 2 sprays into each nostril once daily.  -     montelukast (Singulair) 10 mg tablet; Take 1 tablet (10 mg) by mouth once daily at bedtime.  Class 3 obesity with alveolar hypoventilation, serious comorbidity, and body mass index (BMI) of 60.0 to 69.9 in adult  Pain in other joint  -     etodolac (Lodine) 400 mg tablet; Take 1 tablet (400 mg) by mouth 2 times a day.  Essential hypertension  -     lisinopril 20 mg tablet; Take 1 tablet (20 mg) by mouth once daily.    Patient will call if his sinus symptoms persist past weekend [facility]  Continue routine labs  Next visit he just needs CMP and PSA since cholesterol was good and hemoglobin A1c has been good  Follow-up with me in 3 months  Continue to work on diet  Refills are sent  Lynne Magana MD

## 2025-02-03 ENCOUNTER — TELEPHONE (OUTPATIENT)
Dept: PRIMARY CARE | Facility: CLINIC | Age: 55
End: 2025-02-03
Payer: COMMERCIAL

## 2025-02-03 DIAGNOSIS — J01.10 ACUTE NON-RECURRENT FRONTAL SINUSITIS: Primary | ICD-10-CM

## 2025-02-03 RX ORDER — AMOXICILLIN 875 MG/1
875 TABLET, FILM COATED ORAL 2 TIMES DAILY
Qty: 20 TABLET | Refills: 0 | Status: SHIPPED | OUTPATIENT
Start: 2025-02-03 | End: 2025-02-13

## 2025-02-03 NOTE — TELEPHONE ENCOUNTER
Neptali called because he was in the office on 01/28/25 because of sinus issues. He had been seen at Urgent Care and was given meds.     He was advised to call the office if no better by today, and an antibiotic could be called in for him.     Still has severe discolored drainage and coughing. Sinus pressure.       Please send to [facility] - [phone]

## 2025-04-09 ENCOUNTER — APPOINTMENT (OUTPATIENT)
Dept: PHARMACY | Facility: HOSPITAL | Age: 55
End: 2025-04-09
Payer: COMMERCIAL

## 2025-04-09 DIAGNOSIS — E66.2 CLASS 3 OBESITY WITH ALVEOLAR HYPOVENTILATION, SERIOUS COMORBIDITY, AND BODY MASS INDEX (BMI) OF 60.0 TO 69.9 IN ADULT: ICD-10-CM

## 2025-04-09 DIAGNOSIS — E66.813 CLASS 3 OBESITY WITH ALVEOLAR HYPOVENTILATION, SERIOUS COMORBIDITY, AND BODY MASS INDEX (BMI) OF 60.0 TO 69.9 IN ADULT: ICD-10-CM

## 2025-04-09 RX ORDER — DULAGLUTIDE 4.5 MG/.5ML
4.5 INJECTION, SOLUTION SUBCUTANEOUS
Qty: 6 ML | Refills: 1 | Status: SHIPPED | OUTPATIENT
Start: 2025-04-13

## 2025-04-09 NOTE — PROGRESS NOTES
Pharmacist Clinic: Weight Management    Neptali Estrada was referred to the Clinical Pharmacy Team for weight management.     Referring Provider: Lynne Magana MD    HISTORY OF PRESENT ILLNESS  Patient was referred to Clinical Pharmacy for weight loss management.     Patient reports sinus infections has cleared up.     He has not lost as much weight but he is noticing more loss of inches in waist and clothing size. Reports consistent BM, no constipation. Staying well hydrated.     Has appointment with ortho for knee replacement now that his weight is getting closer to goal for surgery.      Diet:   -Breakfast: eggs, toast  -Lunch: salad or sandwich with chips  -Dinner: chicken and vegetables, pasta, eats meal around 6pm  -Snacks: peanuts, cashews     Exercise Routine: per patient, unable to get much exercise with pain from knees. Patient states he needs a knee replacement, but needs to lose weight prior to surgery.    Additional Contributory Factors/Comorbidities:  Dyslipidemia, hypertension.    Weight   Per patient, home scale does not register over 400 lbs, will give error reading (no home weight prior to PCP visit)  423 lbs (initial -at PCP appointment)  399 lbs -5/8/2024   379 lbs -6/5/2024  362 lbs -7/1/2024  335 lbs -7/31/2024  354 lbs -8/5/2024 (office scale)  345 lbs -8/28/2024  331 lbs -9/25/2024  315 lbs -10/23/2024  304 lbs -11/20/2024  298 lbs - 1/15/2025  312 lbs 1/28/2025  283 lbs 4/9/2025    *Of note, patient bought a new scale prior to starting Trulicity, unclear why there is such a big difference between office and patient's home scale.     PHARMACY ASSESSMENT  Pertinent PMH Review:  - PMH of Pancreatitis: No  - PMH of Retinopathy: No  - PMH of MTC: No    Allergies: Patient has no known allergies.     [id number] - [place], [place] - [address]  [address]  [facility]placed by [facility] [address]  Phone: [phone] Fax: [phone]    [address]  [facility] [address]  Phone: [phone] Fax: [phone]      Affordability/Accessibility:   -Trulicity: covered by insurance  -Wegovy: PA  -Zepbound: PA  -Ozempic: PA  -Mounjaro: PA    Adherence/Organization: no issues per patient  Adverse Effects: none     CURRENT PHARMACOTHERAPY  - Trulicity 4.5 mg under the skin once weekly    ASCVD PREVENTION  -Statin? Yes, atorvastatin 10mg  -Aspirin? No    MEDICATION RECONCILIATION  No changes to medication record at this time. Patient able to confirm medications and directions.    DRUG INTERACTIONS  - No significant drug-drug interactions exist that require adjustment to therapy    LAB  Lab Results   Component Value Date    BILITOT 0.5 01/23/2025    CALCIUM 9.3 01/23/2025    CO2 28 01/23/2025     01/23/2025    CREATININE 1.00 01/23/2025    GLUCOSE 77 01/23/2025    ALKPHOS 54 01/23/2025    K 4.6 01/23/2025    PROT 6.8 01/23/2025     01/23/2025    AST 16 01/23/2025    ALT 17 01/23/2025    BUN 29 (H) 01/23/2025    ANIONGAP 12 01/23/2025    ALBUMIN 4.3 01/23/2025    EGFR 89 01/23/2025     Lab Results   Component Value Date    TRIG 89 01/23/2025    CHOL 121 01/23/2025    LDLCALC 56 01/23/2025    HDL 47.7 01/23/2025     Lab Results   Component Value Date    HGBA1C 4.6 01/23/2025       Current Outpatient Medications on File Prior to Visit   Medication Sig Dispense Refill    albuterol (Ventolin HFA) 90 mcg/actuation inhaler Inhale 2 puffs every 4 hours if needed for wheezing. 18 g 2    atorvastatin (Lipitor) 10 mg tablet Take 1 tablet (10 mg) by mouth once daily. 30 tablet 5    cetirizine (ZyrTEC) 10 mg tablet Take 1 tablet (10 mg) by mouth once daily. 30 tablet 5    dulaglutide (Trulicity) 4.5 mg/0.5 mL pen injector Inject 4.5 mg under the skin 1 (one) time per week. 6 mL 1    etodolac (Lodine) 400 mg tablet Take 1 tablet (400 mg) by mouth 2 times a day. 60 tablet 5    fluticasone (Flonase) 50 mcg/actuation nasal spray Administer 2 sprays into each nostril once  daily. 16 g 5    lisinopril 20 mg tablet Take 1 tablet (20 mg) by mouth once daily. 30 tablet 5    montelukast (Singulair) 10 mg tablet Take 1 tablet (10 mg) by mouth once daily at bedtime. 30 tablet 5    sodium hyaluronate (Gelsyn-3) 16.8 mg/2 mL injection Inject 2 mL (16.8 mg) into the joint every 7 days. Inject one 16.8mg/2ml prefilled syringe intra-articularly into the left knee once weekly for three weeks; Qty: 1 series, 3 injections 6 mL 0     No current facility-administered medications on file prior to visit.       PATIENT EDUCATION/GOALS  - Counseled patient on MOA, expectations, side effects, duration of therapy, contraindications, administration, and monitoring parameters  - Answered all patient questions and concerns; provided [facility] phone number if issues/questions arise  -We have discussed the off-label use of once-weekly Trulicity which is indicated and approved by the FDA for Type Two Diabetes Mellitus treatment. Medication cost and coverage for this agent may change at any time as determined by your primary insurance carrier.     RECOMMENDATIONS/PLAN  Problem List Items Addressed This Visit       Class 3 obesity with alveolar hypoventilation, serious comorbidity, and body mass index (BMI) of 60.0 to 69.9 in adult (Multi)         Weight Loss Plan: patient is a good candidate for weight management with GLP-1 given initial BMI of 60.69 kg/m2. Given continued tolerance and weight loss, will continue therapy.   Continue: Trulicity 4.5 mg once weekly    Clinical Pharmacist follow up: 7/9/2025 @ 9:00 AM  Type of Encounter: Virtual    Continue all meds under the continuation of care with the referring provider and clinical pharmacy team.    Johnny Billy, PharmD, [facility]  [phone]    Verbal consent to manage patient's drug therapy was obtained from patient. They were informed they may decline to participate or withdraw from participation in pharmacy services at any time.

## 2025-04-22 ENCOUNTER — OFFICE VISIT (OUTPATIENT)
Dept: ORTHOPEDIC SURGERY | Facility: CLINIC | Age: 55
End: 2025-04-22
Payer: COMMERCIAL

## 2025-04-22 ENCOUNTER — HOSPITAL ENCOUNTER (OUTPATIENT)
Dept: RADIOLOGY | Facility: CLINIC | Age: 55
Discharge: HOME | End: 2025-04-22
Payer: COMMERCIAL

## 2025-04-22 VITALS — HEIGHT: 70 IN | BODY MASS INDEX: 40.52 KG/M2 | WEIGHT: 283 LBS

## 2025-04-22 DIAGNOSIS — M25.562 CHRONIC PAIN OF LEFT KNEE: ICD-10-CM

## 2025-04-22 DIAGNOSIS — G89.29 CHRONIC PAIN OF LEFT KNEE: ICD-10-CM

## 2025-04-22 DIAGNOSIS — M25.561 RIGHT KNEE PAIN, UNSPECIFIED CHRONICITY: ICD-10-CM

## 2025-04-22 DIAGNOSIS — G89.29 CHRONIC PAIN OF RIGHT KNEE: ICD-10-CM

## 2025-04-22 DIAGNOSIS — M25.561 CHRONIC PAIN OF RIGHT KNEE: ICD-10-CM

## 2025-04-22 DIAGNOSIS — M17.12 ARTHRITIS OF LEFT KNEE: Primary | ICD-10-CM

## 2025-04-22 PROCEDURE — 73564 X-RAY EXAM KNEE 4 OR MORE: CPT | Mod: LEFT SIDE | Performed by: RADIOLOGY

## 2025-04-22 PROCEDURE — 99214 OFFICE O/P EST MOD 30 MIN: CPT | Mod: 25 | Performed by: ORTHOPAEDIC SURGERY

## 2025-04-22 PROCEDURE — 73560 X-RAY EXAM OF KNEE 1 OR 2: CPT | Mod: LT

## 2025-04-22 PROCEDURE — 2500000004 HC RX 250 GENERAL PHARMACY W/ HCPCS (ALT 636 FOR OP/ED): Performed by: ORTHOPAEDIC SURGERY

## 2025-04-22 PROCEDURE — 73564 X-RAY EXAM KNEE 4 OR MORE: CPT | Mod: RT

## 2025-04-22 PROCEDURE — 73560 X-RAY EXAM OF KNEE 1 OR 2: CPT | Mod: LEFT SIDE | Performed by: RADIOLOGY

## 2025-04-22 PROCEDURE — 3008F BODY MASS INDEX DOCD: CPT | Performed by: ORTHOPAEDIC SURGERY

## 2025-04-22 PROCEDURE — 20610 DRAIN/INJ JOINT/BURSA W/O US: CPT | Mod: RT | Performed by: ORTHOPAEDIC SURGERY

## 2025-04-22 PROCEDURE — 1036F TOBACCO NON-USER: CPT | Performed by: ORTHOPAEDIC SURGERY

## 2025-04-22 RX ORDER — LIDOCAINE HYDROCHLORIDE 10 MG/ML
2 INJECTION, SOLUTION INFILTRATION; PERINEURAL
Status: COMPLETED | OUTPATIENT
Start: 2025-04-22 | End: 2025-04-22

## 2025-04-22 RX ORDER — TRIAMCINOLONE ACETONIDE 40 MG/ML
40 INJECTION, SUSPENSION INTRA-ARTICULAR; INTRAMUSCULAR
Status: COMPLETED | OUTPATIENT
Start: 2025-04-22 | End: 2025-04-22

## 2025-04-22 RX ADMIN — TRIAMCINOLONE ACETONIDE 40 MG: 40 INJECTION, SUSPENSION INTRA-ARTICULAR; INTRAMUSCULAR at 18:10

## 2025-04-22 RX ADMIN — LIDOCAINE HYDROCHLORIDE 2 ML: 10 INJECTION, SOLUTION INFILTRATION; PERINEURAL at 18:10

## 2025-04-22 NOTE — PROGRESS NOTES
54-year-old is seen with left and right knee pain.  Left knee bothers him more than the right.  Is been having persistent severe sharp shooting pain in the left knee that is worse with standing and walking going up and down stairs and getting up and down from a chair in and out of a car.  He is severely limited and debilitated on a daily basis.  He has been continuing to work on weight loss and exercise and has lost additional weight.  He has history of the left ACL reconstruction.  He has taken Tylenol and Lodine.  He has done physical therapy.  He has applied ice and used ointments.    Pleasant and no acute distress. Walks with a antalgic gait. Stands with varus alignment of both knees. Right knee range of motion is 5-110°. There is a mild effusion. The knee is stable to varus and valgus stress Lachman and posterior drawer. There is generalized tenderness. Left knee range of motion is 5-110°. There is a mild effusion. The knee is stable to varus and valgus stress Lachman and posterior drawer. There is generalized tenderness. Both lower extremities are well perfused the skin is intact and muscle tone is adequate.    Multiple x-ray views of the left knee are personally reviewed and these demonstrate advanced degenerative changes with complete loss of joint space and osteophyte formation and subchondral sclerosis and cystic change.  ACL hardware is present with metal screws in the femur and tibia.    The patient has undergone extensive conservative treatment but has persistent severe debilitating pain and advanced degenerative changes on x-ray.  Treatment options including no treatment reviewed and the decision was made to proceed with left total knee arthroplasty.  Hardware removal would be done as needed in regard to the ACL screws.  The surgery and postoperative course were reviewed in detail.  Risks including but not limited to infection thromboembolus neurovascular injury fracture bleeding medical problems  stiffness and implant failure or loosening were discussed and they understand this and have elected to proceed.  They will have medical clearance.  Avoidance of aggravating activities will be done in the meantime.  Intravenous TXA will be used at the time of the procedure.  He will continue working on weight loss and exercise.  In regard to the right knee decision was made to proceed with cortisone injection.    Patient ID: Neptali Estrada is a 54 y.o. male.    L Inj/Asp: R knee on 4/22/2025 6:10 PM  Indications: pain  Details: 22 G needle, superolateral approach  Medications: 40 mg triamcinolone acetonide 40 mg/mL; 2 mL lidocaine 10 mg/mL (1 %)  Procedure, treatment alternatives, risks and benefits explained, specific risks discussed. Consent was given by the patient.

## 2025-04-24 PROBLEM — M17.12 ARTHRITIS OF LEFT KNEE: Status: ACTIVE | Noted: 2025-04-22

## 2025-04-25 LAB
ALBUMIN SERPL-MCNC: 4.3 G/DL (ref 3.6–5.1)
ALP SERPL-CCNC: 46 U/L (ref 35–144)
ALT SERPL-CCNC: 14 U/L (ref 9–46)
ANION GAP SERPL CALCULATED.4IONS-SCNC: 9 MMOL/L (CALC) (ref 7–17)
AST SERPL-CCNC: 12 U/L (ref 10–35)
BILIRUB SERPL-MCNC: 0.6 MG/DL (ref 0.2–1.2)
BUN SERPL-MCNC: 30 MG/DL (ref 7–25)
CALCIUM SERPL-MCNC: 9.3 MG/DL (ref 8.6–10.3)
CHLORIDE SERPL-SCNC: 108 MMOL/L (ref 98–110)
CO2 SERPL-SCNC: 25 MMOL/L (ref 20–32)
CREAT SERPL-MCNC: 0.98 MG/DL (ref 0.7–1.3)
EGFRCR SERPLBLD CKD-EPI 2021: 91 ML/MIN/1.73M2
GLUCOSE SERPL-MCNC: 79 MG/DL (ref 65–99)
POTASSIUM SERPL-SCNC: 4.5 MMOL/L (ref 3.5–5.3)
PROT SERPL-MCNC: 6.4 G/DL (ref 6.1–8.1)
PSA SERPL-MCNC: 0.85 NG/ML
SODIUM SERPL-SCNC: 142 MMOL/L (ref 135–146)

## 2025-04-26 LAB
ALBUMIN SERPL-MCNC: 4.3 G/DL (ref 3.6–5.1)
ALP SERPL-CCNC: 46 U/L (ref 35–144)
ALT SERPL-CCNC: 13 U/L (ref 9–46)
ANION GAP SERPL CALCULATED.4IONS-SCNC: 6 MMOL/L (CALC) (ref 7–17)
AST SERPL-CCNC: 11 U/L (ref 10–35)
BILIRUB SERPL-MCNC: 0.6 MG/DL (ref 0.2–1.2)
BUN SERPL-MCNC: 31 MG/DL (ref 7–25)
CALCIUM SERPL-MCNC: 9.3 MG/DL (ref 8.6–10.3)
CHLORIDE SERPL-SCNC: 108 MMOL/L (ref 98–110)
CHOLEST SERPL-MCNC: 132 MG/DL
CHOLEST/HDLC SERPL: 2.6 (CALC)
CO2 SERPL-SCNC: 29 MMOL/L (ref 20–32)
CREAT SERPL-MCNC: 1.04 MG/DL (ref 0.7–1.3)
EGFRCR SERPLBLD CKD-EPI 2021: 85 ML/MIN/1.73M2
EST. AVERAGE GLUCOSE BLD GHB EST-MCNC: 94 MG/DL
EST. AVERAGE GLUCOSE BLD GHB EST-SCNC: 5.2 MMOL/L
GLUCOSE SERPL-MCNC: 80 MG/DL (ref 65–99)
HBA1C MFR BLD: 4.9 %
HDLC SERPL-MCNC: 50 MG/DL
LDLC SERPL CALC-MCNC: 68 MG/DL (CALC)
NONHDLC SERPL-MCNC: 82 MG/DL (CALC)
POTASSIUM SERPL-SCNC: 4.8 MMOL/L (ref 3.5–5.3)
PROT SERPL-MCNC: 6.4 G/DL (ref 6.1–8.1)
SODIUM SERPL-SCNC: 143 MMOL/L (ref 135–146)
TRIGL SERPL-MCNC: 67 MG/DL

## 2025-05-02 ENCOUNTER — APPOINTMENT (OUTPATIENT)
Dept: PRIMARY CARE | Facility: CLINIC | Age: 55
End: 2025-05-02
Payer: COMMERCIAL

## 2025-05-02 VITALS
TEMPERATURE: 98.1 F | OXYGEN SATURATION: 97 % | DIASTOLIC BLOOD PRESSURE: 82 MMHG | SYSTOLIC BLOOD PRESSURE: 112 MMHG | HEIGHT: 70 IN | HEART RATE: 89 BPM | BODY MASS INDEX: 41.09 KG/M2 | WEIGHT: 287 LBS

## 2025-05-02 DIAGNOSIS — E66.2 CLASS 3 OBESITY WITH ALVEOLAR HYPOVENTILATION, SERIOUS COMORBIDITY, AND BODY MASS INDEX (BMI) OF 60.0 TO 69.9 IN ADULT: Primary | ICD-10-CM

## 2025-05-02 DIAGNOSIS — I10 ESSENTIAL HYPERTENSION: ICD-10-CM

## 2025-05-02 DIAGNOSIS — M25.59 PAIN IN OTHER JOINT: ICD-10-CM

## 2025-05-02 DIAGNOSIS — E66.813 CLASS 3 OBESITY WITH ALVEOLAR HYPOVENTILATION, SERIOUS COMORBIDITY, AND BODY MASS INDEX (BMI) OF 60.0 TO 69.9 IN ADULT: Primary | ICD-10-CM

## 2025-05-02 DIAGNOSIS — E78.5 DYSLIPIDEMIA: ICD-10-CM

## 2025-05-02 DIAGNOSIS — J45.909 ASTHMA, UNSPECIFIED ASTHMA SEVERITY, UNSPECIFIED WHETHER COMPLICATED, UNSPECIFIED WHETHER PERSISTENT (HHS-HCC): ICD-10-CM

## 2025-05-02 PROCEDURE — 3074F SYST BP LT 130 MM HG: CPT | Performed by: INTERNAL MEDICINE

## 2025-05-02 PROCEDURE — 3079F DIAST BP 80-89 MM HG: CPT | Performed by: INTERNAL MEDICINE

## 2025-05-02 PROCEDURE — 3008F BODY MASS INDEX DOCD: CPT | Performed by: INTERNAL MEDICINE

## 2025-05-02 PROCEDURE — 1036F TOBACCO NON-USER: CPT | Performed by: INTERNAL MEDICINE

## 2025-05-02 PROCEDURE — 99214 OFFICE O/P EST MOD 30 MIN: CPT | Performed by: INTERNAL MEDICINE

## 2025-05-02 RX ORDER — ETODOLAC 400 MG/1
400 TABLET, FILM COATED ORAL 2 TIMES DAILY
Qty: 60 TABLET | Refills: 5 | Status: SHIPPED | OUTPATIENT
Start: 2025-05-02

## 2025-05-02 RX ORDER — ATORVASTATIN CALCIUM 10 MG/1
10 TABLET, FILM COATED ORAL DAILY
Qty: 30 TABLET | Refills: 5 | Status: SHIPPED | OUTPATIENT
Start: 2025-05-02 | End: 2025-10-29

## 2025-05-02 RX ORDER — MONTELUKAST SODIUM 10 MG/1
10 TABLET ORAL NIGHTLY
Qty: 30 TABLET | Refills: 5 | Status: SHIPPED | OUTPATIENT
Start: 2025-05-02

## 2025-05-02 RX ORDER — FLUTICASONE PROPIONATE 50 MCG
2 SPRAY, SUSPENSION (ML) NASAL
Qty: 16 G | Refills: 5 | Status: SHIPPED | OUTPATIENT
Start: 2025-05-02

## 2025-05-02 RX ORDER — LISINOPRIL 20 MG/1
20 TABLET ORAL DAILY
Qty: 30 TABLET | Refills: 5 | Status: SHIPPED | OUTPATIENT
Start: 2025-05-02

## 2025-05-02 RX ORDER — CETIRIZINE HYDROCHLORIDE 10 MG/1
10 TABLET ORAL DAILY
Qty: 30 TABLET | Refills: 5 | Status: SHIPPED | OUTPATIENT
Start: 2025-05-02

## 2025-05-02 ASSESSMENT — ENCOUNTER SYMPTOMS
OCCASIONAL FEELINGS OF UNSTEADINESS: 0
LOSS OF SENSATION IN FEET: 0
DEPRESSION: 0

## 2025-05-02 NOTE — PROGRESS NOTES
"Subjective   Patient ID: Neptali Estrada is a 54 y.o. male who presents for Follow-up (EP.  Follow up HTN & weight.  Labs done.  L total knee arthroplasty 6/18.  No concerns.).  HPI    History of Present Illness  Neptali Estrada is a 54 year old male with obesity and osteoarthritis who presents for follow-up and preoperative evaluation for left total knee replacement. And fu elevated high bmi, hyperlipidemia,htn and asthma     He has been experiencing significant knee pain, primarily in the left knee, for a considerable time. Imaging revealed that the right knee is in a similar condition to the left. He anticipates increased awareness of the right knee pain after the left knee surgery.    He has achieved a significant weight loss of 143 pounds, currently weighing 287 pounds, which was a prerequisite for his upcoming surgery. His current medication regimen includes atorvastatin for cholesterol, Trulicity for weight and blood sugar management, lisinopril for blood pressure, and montelukast.    Recent laboratory results show a blood sugar level of 79 mg/dL and a hemoglobin A1c of 4.9%. Cholesterol levels are well-controlled with an LDL of 68 mg/dL, HDL of 50 mg/dL, and triglycerides of 67 mg/dL.    No chest pain, shortness of breath, heart racing, or leg swelling. He uses a CPAP machine for sleep apnea and is compliant with its use. No respiratory issues or postnasal drip. No issues with his teeth or jaw.  No previous anesthesia issues   No infections noted and no jaw pain     Review of Systems  Review of systems was performed and is otherwise negative except as noted in HPI.      Objective   /82   Pulse 89   Temp 36.7 °C (98.1 °F) (Oral)   Ht 1.778 m (5' 10\")   Wt 130 kg (287 lb)   SpO2 97%   BMI 41.18 kg/m²      Physical Exam  HEENT is normal  Lungs clear bilaterally  Heart is regular rate rhythm no murmurs  Abdomen benign  Lower extremities no edema     Assessment/Plan   Diagnoses and all orders for this " visit:  Class 3 obesity with alveolar hypoventilation, serious comorbidity, and body mass index (BMI) of 60.0 to 69.9 in adult  -     Hemoglobin A1C; Future  Asthma, unspecified asthma severity, unspecified whether complicated, unspecified whether persistent ([facility])  -     montelukast (Singulair) 10 mg tablet; Take 1 tablet (10 mg) by mouth once daily at bedtime.  -     fluticasone (Flonase) 50 mcg/actuation nasal spray; Administer 2 sprays into each nostril once daily.  -     cetirizine (ZyrTEC) 10 mg tablet; Take 1 tablet (10 mg) by mouth once daily.  Essential hypertension  -     lisinopril 20 mg tablet; Take 1 tablet (20 mg) by mouth once daily.  Pain in other joint  -     etodolac (Lodine) 400 mg tablet; Take 1 tablet (400 mg) by mouth 2 times a day.  Dyslipidemia  -     atorvastatin (Lipitor) 10 mg tablet; Take 1 tablet (10 mg) by mouth once daily.  -     Lipid Panel; Future  -     Comprehensive Metabolic Panel; Future    Assessment & Plan  Pain in other joint  Scheduled for left total knee replacement on June 18. Right knee shows similar degeneration as left knee, managed with cortisone injections. Plans to address right knee after recovery from left knee surgery. Right hip may require arthroscopic intervention in the future. - Proceed with left total knee replacement on June 18.  - Follow up with orthopedic surgeon post-surgery for evaluation of right knee and hip.  - Continue weight management and physical therapy as advised.  - Use prescribed antibiotic prophylaxis before dental procedures to prevent infection.    Essential hypertension  Blood pressure is well-controlled at 112/82 mmHg. No reported symptoms of chest pain, shortness of breath, or heart racing. Continues on lisinopril 20 mg daily.  - Continue lisinopril 20 mg oral once daily.    Dyslipidemia  Cholesterol levels are well-managed with atorvastatin. Total cholesterol is 132 mg/dL, LDL is 68 mg/dL, HDL is 50 mg/dL, and triglycerides are 67  mg/dL. The lipid profile is within target range.  - Continue atorvastatin 10 mg oral once daily.  Fu 3 mos bw before call w gregg Magana MD  This medical note was created with the assistance of artificial intelligence (AI) for documentation purposes. The content has been reviewed and confirmed by the healthcare provider for accuracy and completeness. Patient consented to the use of audio recording and use of AI during their visit.

## 2025-06-03 ENCOUNTER — PHARMACY VISIT (OUTPATIENT)
Dept: PHARMACY | Facility: CLINIC | Age: 55
End: 2025-06-03
Payer: MEDICAID

## 2025-06-03 PROCEDURE — RXMED WILLOW AMBULATORY MEDICATION CHARGE

## 2025-06-03 RX ORDER — CHLORHEXIDINE GLUCONATE ORAL RINSE 1.2 MG/ML
15 SOLUTION DENTAL DAILY
Qty: 473 ML | Refills: 0 | Status: SHIPPED | OUTPATIENT
Start: 2025-06-03 | End: 2025-06-14

## 2025-06-03 RX ORDER — CHLORHEXIDINE GLUCONATE 40 MG/ML
SOLUTION TOPICAL DAILY
Qty: 118 ML | Refills: 0 | Status: SHIPPED | OUTPATIENT
Start: 2025-06-03 | End: 2025-06-08

## 2025-06-04 ENCOUNTER — PRE-ADMISSION TESTING (OUTPATIENT)
Dept: PREADMISSION TESTING | Facility: HOSPITAL | Age: 55
End: 2025-06-04
Payer: COMMERCIAL

## 2025-06-04 VITALS
DIASTOLIC BLOOD PRESSURE: 75 MMHG | HEIGHT: 70 IN | HEART RATE: 72 BPM | OXYGEN SATURATION: 97 % | RESPIRATION RATE: 18 BRPM | WEIGHT: 286.6 LBS | TEMPERATURE: 95 F | BODY MASS INDEX: 41.03 KG/M2 | SYSTOLIC BLOOD PRESSURE: 128 MMHG

## 2025-06-04 DIAGNOSIS — Z01.818 PREOP TESTING: Primary | ICD-10-CM

## 2025-06-04 LAB
APTT PPP: 30 SECONDS (ref 26–36)
ERYTHROCYTE [DISTWIDTH] IN BLOOD BY AUTOMATED COUNT: 13.3 % (ref 11.5–14.5)
HCT VFR BLD AUTO: 41.6 % (ref 41–52)
HGB BLD-MCNC: 13.5 G/DL (ref 13.5–17.5)
INR PPP: 1 (ref 0.9–1.1)
MCH RBC QN AUTO: 31.5 PG (ref 26–34)
MCHC RBC AUTO-ENTMCNC: 32.5 G/DL (ref 32–36)
MCV RBC AUTO: 97 FL (ref 80–100)
NRBC BLD-RTO: 0 /100 WBCS (ref 0–0)
PLATELET # BLD AUTO: 212 X10*3/UL (ref 150–450)
PROTHROMBIN TIME: 11.2 SECONDS (ref 9.8–12.4)
RBC # BLD AUTO: 4.29 X10*6/UL (ref 4.5–5.9)
WBC # BLD AUTO: 5.9 X10*3/UL (ref 4.4–11.3)

## 2025-06-04 PROCEDURE — 93005 ELECTROCARDIOGRAM TRACING: CPT

## 2025-06-04 PROCEDURE — 85027 COMPLETE CBC AUTOMATED: CPT

## 2025-06-04 PROCEDURE — 99204 OFFICE O/P NEW MOD 45 MIN: CPT | Performed by: NURSE PRACTITIONER

## 2025-06-04 PROCEDURE — 93010 ELECTROCARDIOGRAM REPORT: CPT | Performed by: INTERNAL MEDICINE

## 2025-06-04 PROCEDURE — 85730 THROMBOPLASTIN TIME PARTIAL: CPT

## 2025-06-04 PROCEDURE — 87081 CULTURE SCREEN ONLY: CPT | Mod: PARLAB

## 2025-06-04 PROCEDURE — 36415 COLL VENOUS BLD VENIPUNCTURE: CPT

## 2025-06-04 RX ORDER — BIOTIN 5 MG
1000 TABLET ORAL DAILY
COMMUNITY

## 2025-06-04 RX ORDER — BISMUTH SUBSALICYLATE 262 MG
1 TABLET,CHEWABLE ORAL DAILY
COMMUNITY

## 2025-06-04 ASSESSMENT — DUKE ACTIVITY SCORE INDEX (DASI)
CAN YOU PARTICIPATE IN MODERATE RECREATIONAL ACTIVITIES LIKE GOLF, BOWLING, DANCING, DOUBLES TENNIS OR THROWING A BASEBALL OR FOOTBALL: NO
CAN YOU TAKE CARE OF YOURSELF (EAT, DRESS, BATHE, OR USE TOILET): YES
CAN YOU CLIMB A FLIGHT OF STAIRS OR WALK UP A HILL: NO
CAN YOU DO MODERATE WORK AROUND THE HOUSE LIKE VACUUMING, SWEEPING FLOORS OR CARRYING GROCERIES: YES
CAN YOU WALK A BLOCK OR TWO ON LEVEL GROUND: YES
CAN YOU HAVE SEXUAL RELATIONS: YES
CAN YOU DO LIGHT WORK AROUND THE HOUSE LIKE DUSTING OR WASHING DISHES: YES
CAN YOU DO YARD WORK LIKE RAKING LEAVES, WEEDING OR PUSHING A MOWER: NO
CAN YOU RUN A SHORT DISTANCE: NO
TOTAL_SCORE: 18.7
CAN YOU PARTICIPATE IN STRENOUS SPORTS LIKE SWIMMING, SINGLES TENNIS, FOOTBALL, BASKETBALL, OR SKIING: NO
CAN YOU WALK INDOORS, SUCH AS AROUND YOUR HOUSE: YES
DASI METS SCORE: 5
CAN YOU DO HEAVY WORK AROUND THE HOUSE LIKE SCRUBBING FLOORS OR LIFTING AND MOVING HEAVY FURNITURE: NO

## 2025-06-04 ASSESSMENT — PAIN - FUNCTIONAL ASSESSMENT: PAIN_FUNCTIONAL_ASSESSMENT: 0-10

## 2025-06-04 ASSESSMENT — PAIN SCALES - GENERAL: PAINLEVEL_OUTOF10: 6

## 2025-06-04 ASSESSMENT — PAIN DESCRIPTION - DESCRIPTORS: DESCRIPTORS: ACHING;TIGHTNESS

## 2025-06-04 NOTE — CPM/PAT H&P
"CPM/PAT Evaluation       Name: Neptali Estrada (Neptali Estrada)  /Age: 1970/54 y.o.     Visit Type:       Chief Complaint:     54  yr old male presents to PAT for pre-operative evaluation, with c/o knee arthritis   LEFT TOTAL KNEE REPLACEMENT / WITH HARDWARE REMOVAL  is Scheduled on 2025 with Dr. Lara    The patient has the following past medical history:  Obesity, asthma, HTN/ HLD      Chief complaint:  He reports c/o worsening left knee pain since -- >states has bilateral knee and right hip pain due to OA.  States \"was unable to have surgery d/t weight and age.\"  Pain located to entire knee, mainly to the anterior aspect \"on the knee cap\"  Endorses to entire knee joint  C/o knee \"getting jammed like a knife, locks up d/t arthritis\" and improves with movement  He has tried steroid injections  with alternating gel injections, with improvement every 3-6 months  Currently manages pain with Etolac     PCP Dr. Magana, LOV 2025    Denies fever, chills or nausea.   Denies any past issues with anesthesia.        Vitals:    25 0833   BP: 128/75   Pulse: 72   Resp: 18   Temp: 35 °C (95 °F)   SpO2: 97%          Medical History[1]    Surgical History[2]    Patient Sexual activity questions deferred to the physician.    Family History[3]    Allergies[4]    Prior to Admission medications    Medication Sig Start Date End Date Taking? Authorizing Provider   albuterol (Ventolin HFA) 90 mcg/actuation inhaler Inhale 2 puffs every 4 hours if needed for wheezing. 25   Lynne Magana MD   atorvastatin (Lipitor) 10 mg tablet Take 1 tablet (10 mg) by mouth once daily. 5/2/25 10/29/25  Lynne Magana MD   cetirizine (ZyrTEC) 10 mg tablet Take 1 tablet (10 mg) by mouth once daily. 25   Lynne Magana MD   chlorhexidine (Hibiclens) 4 % external liquid Wash topically daily for 5 days prior to surgery with day 5 being morning of surgery. 6/3/25 6/8/25  MADELINE Krishna-CNP   chlorhexidine " (Peridex) 0.12 % solution Swish and Spit 15 mLs by mouth on the day before surgery and again the morning of surgery. 6/3/25 6/14/25  Margaret Quiñonez APRN-CNP   dulaglutide (Trulicity) 4.5 mg/0.5 mL pen injector Inject 4.5 mg under the skin 1 (one) time per week. 4/13/25   Lynne Magana MD   etodolac (Lodine) 400 mg tablet Take 1 tablet (400 mg) by mouth 2 times a day. 5/2/25   Lynne Magana MD   fluticasone (Flonase) 50 mcg/actuation nasal spray Administer 2 sprays into each nostril once daily. 5/2/25   Lynne Magana MD   lisinopril 20 mg tablet Take 1 tablet (20 mg) by mouth once daily. 5/2/25   Lynne Magana MD   montelukast (Singulair) 10 mg tablet Take 1 tablet (10 mg) by mouth once daily at bedtime. 5/2/25   Lynne Magana MD   sodium hyaluronate (Gelsyn-3) 16.8 mg/2 mL injection Inject 2 mL (16.8 mg) into the joint every 7 days. Inject one 16.8mg/2ml prefilled syringe intra-articularly into the left knee once weekly for three weeks; Qty: 1 series, 3 injections 1/17/24   Terry Lara MD          Review of Systems  Constitutional: NO F, chills, or sweats  Eyes: no blurred vision or visual disturbance  ENT: allegies/  congestion, sore throat, difficulty hearing  Cardiovascular: no chest pain, no edema, no palps and no syncope.   Respiratory: PND/  cough with tight chest and used inhaler- 6/3, no s.o.b. and no wheezing  Gastrointestinal: hemorrhoid, no abdominal pain, no N/V, no blood in stools  Genitourinary: no dysuria, no urinary frequency, no urinary hesitancy and no feelings of urinary urgency.   Musculoskeletal: no arthralgias,  no back pain and no myalgias.   Integumentary: no new skin lesions and no rashes.   Neurological: + difficulty walking/ use of cane, no headache, no limb weakness, no numbness and no tingling.   Endocrine: no recent weight gain and no recent weight loss.   Hematologic/Lymphatic: no tendency for easy bruising and no swollen glands.      Physical Exam  Constitutional:   "     Appearance: Normal appearance.   Cardiovascular:      Rate and Rhythm: Normal rate.      Heart sounds: Normal heart sounds.   Pulmonary:      Effort: Pulmonary effort is normal.      Breath sounds: Normal breath sounds.   Abdominal:      General: Bowel sounds are normal.      Palpations: Abdomen is soft.   Musculoskeletal:         General: Normal range of motion.      Cervical back: Normal range of motion.      Right lower le+ Edema present.      Left lower le+ Edema present.   Skin:     General: Skin is warm and dry.   Neurological:      Mental Status: He is alert and oriented to person, place, and time.          PAT AIRWAY:   Airway:     Mallampati::  II    TM distance::  <3 FB    Neck ROM::  Full  normal        Visit Vitals  /75   Pulse 72   Temp 35 °C (95 °F) (Tympanic)   Resp 18   Ht 1.778 m (5' 10\")   Wt 130 kg (286 lb 9.6 oz)   SpO2 97%   BMI 41.12 kg/m²   Smoking Status Former   BSA 2.53 m²       DASI Risk Score      Flowsheet Row Pre-Admission Testing from 2025 in [facility] Questionnaire Series Submission from 2025 in [facility] OR with Generic Provider Reagant   Can you take care of yourself (eat, dress, bathe, or use toilet)?  2.75 filed at 2025 0843 2.75  filed at 2025 185   Can you walk indoors, such as around your house? 1.75 filed at 2025 0843 1.75  filed at 2025 1856   Can you walk a block or two on level ground?  2.75 filed at 2025 0843 2.75  filed at 2025 1856   Can you climb a flight of stairs or walk up a hill? 0 filed at 2025 0843 5.5  filed at 2025 185   Can you run a short distance? 0 filed at 2025 0843 0  filed at 2025 185   Can you do light work around the house like dusting or washing dishes? 2.7 filed at 2025 0843 2.7  filed at 2025 185   Can you do moderate work around the house like vacuuming, sweeping floors or carrying groceries? 3.5 filed at 2025 " 0843 0  filed at 04/24/2025 1856   Can you do heavy work around the house like scrubbing floors or lifting and moving heavy furniture?  0 filed at 06/04/2025 0843 0  filed at 04/24/2025 1856   Can you do yard work like raking leaves, weeding or pushing a mower? 0 filed at 06/04/2025 0843 0  filed at 04/24/2025 1856   Can you have sexual relations? 5.25 filed at 06/04/2025 0843 5.25  filed at 04/24/2025 1856   Can you participate in moderate recreational activities like golf, bowling, dancing, doubles tennis or throwing a baseball or football? 0 filed at 06/04/2025 0843 0  filed at 04/24/2025 1856   Can you participate in strenous sports like swimming, singles tennis, football, basketball, or skiing? 0 filed at 06/04/2025 0843 0  filed at 04/24/2025 1856   DASI SCORE 18.7 filed at 06/04/2025 0843 20.7  filed at 04/24/2025 1856   METS Score (Will be calculated only when all the questions are answered) 5 filed at 06/04/2025 0843 5.3  filed at 04/24/2025 1856          Caprini DVT Assessment    No data to display       Modified Frailty Index    No data to display       [id number] Stroke Risk Points  Current as of just now        N/A 0 to 9 Points:      Last Change: N/A          The MYI4ET5-UJHp risk score (Lip GH, et al. 2009. © 2010 [employer]) quantifies the risk of stroke for a patient with atrial fibrillation. For patients without atrial fibrillation or under the age of 18 this score appears as N/A. Higher score values generally indicate higher risk of stroke.        This score is not applicable to this patient. Components are not calculated.          Revised Cardiac Risk Index    No data to display       Apfel Simplified Score    No data to display       Risk Analysis Index Results This Encounter    No data found in the last 10 encounters.       Stop Bang Score      Flowsheet Row Pre-Admission Testing from 6/4/2025 in [facility] Questionnaire Series Submission from 4/24/2025  in [facility] OR with Generic Provider Vinny   Do you snore loudly? 1 filed at 06/04/2025 0843 1  filed at 04/24/2025 1856   Do you often feel tired or fatigued after your sleep? 0 filed at 06/04/2025 0843 0  filed at 04/24/2025 1856   Has anyone ever observed you stop breathing in your sleep? 1 filed at 06/04/2025 0843 1  filed at 04/24/2025 1856   Do you have or are you being treated for high blood pressure? 1 filed at 06/04/2025 0843 1  filed at 04/24/2025 1856   Recent BMI (Calculated) 41.1 filed at 06/04/2025 0843 40.6  filed at 04/24/2025 1856   Is BMI greater than 35 kg/m2? 1=Yes filed at 06/04/2025 0843 1=Yes  filed at 04/24/2025 1856   Age older than 50 years old? 1=Yes filed at 06/04/2025 0843 1=Yes  filed at 04/24/2025 1856   Is your neck circumference greater than 17 inches (Male) or 16 inches (Female)? 0 filed at 06/04/2025 0843 --   Gender - Male 1=Yes filed at 06/04/2025 0843 1=Yes  filed at 04/24/2025 1856   STOP-BANG Total Score 6 filed at 06/04/2025 0843 --          Prodigy: High Risk  Total Score: 8              Prodigy Gender Score          ARISCAT Score for Postoperative Pulmonary Complications    No data to display       Chatterjee Perioperative Risk for Myocardial Infarction or Cardiac Arrest (BRIANNE)    No data to display         ASSESSMENT  Obesity   BMI 41.18  + eight loss over 10+ years/ 425-->273 by self + taking Trulicity x 1 year    HTN/HLD  Takes Lisinopril  ECG 6/4/2025-NSR, 72 bpm    Asthma  Takes inhaler, montelukast, and cetrizine as ordered   Exacerbated with allergies/ last used inhaler 6/3/2025    SETH- severe  Manages with SETH    OA  Plan for LTK as scheduled           ANESTHESIA FINDINGS:  Intubation History: No history of difficult intubation  Significant Anesthesia Considerations:      Airway History: No abnormal airway history  Predictors of Difficult Airway Management  ? SETH- severe  ? Obesity  ? Short thick neck      CONSULTS:    Patient does not require consults  for optimization at this time.     The Following Tests/Procedures Have Been Initiated and Reviewed/ interpreted by me:   CBC, Coag screen, MRSA screen, ECG  CMP reviewed from 4/25/2025     Planned Anesthetic: general     Instructions Given to Patient:  *Reviewed Medications to be taken in AM of surgery or held  Patient given verbal and written preop instructions and voices comprehension and compliance.     No further testing required.      PLAN  This patient is optimally prepared for surgery.             [1]   Past Medical History:  Diagnosis Date    Arthritis     Asthma     Hyperlipidemia     Hypertension     Obesity     Personal history of other diseases of the circulatory system     History of hypertension    Personal history of other diseases of the musculoskeletal system and connective tissue     History of gout    Personal history of other diseases of the respiratory system     History of allergic rhinitis    Personal history of other diseases of the respiratory system 11/14/2017    History of bronchitis    Personal history of other endocrine, nutritional and metabolic disease     History of obesity    Personal history of other medical treatment     History of nuclear stress test    Personal history of other medical treatment     History of echocardiogram    Sleep apnea     Unilateral primary osteoarthritis, unspecified knee 02/12/2020    Arthritis of knee   [2]   Past Surgical History:  Procedure Laterality Date    COLONOSCOPY      KNEE ARTHROSCOPY W/ DEBRIDEMENT  06/02/2015    Knee Arthroscopy (Therapeutic)    KNEE SURGERY  06/02/2015    Knee Surgery    ROTATOR CUFF REPAIR      SHOULDER SURGERY  06/02/2015    Shoulder Surgery    UPPER GASTROINTESTINAL ENDOSCOPY      VASECTOMY     [3]   Family History  Problem Relation Name Age of Onset    Heart disease Mother      Hyperlipidemia Mother      Hypertension Mother      Diabetes Mother      Breast cancer Mother      Arthritis Mother      Allergies Mother       Allergies Father      Cancer Father      Hypertension Father      Hyperlipidemia Father      Heart disease Father      Cancer Sister      Lymphoma Brother     [4] No Known Allergies

## 2025-06-04 NOTE — PREPROCEDURE INSTRUCTIONS
Medication List            Accurate as of June 4, 2025  9:03 AM. Always use your most recent med list.                albuterol 90 mcg/actuation inhaler  Commonly known as: Ventolin HFA  Inhale 2 puffs every 4 hours if needed for wheezing.  Medication Adjustments for Surgery: Take/Use as prescribed     atorvastatin 10 mg tablet  Commonly known as: Lipitor  Take 1 tablet (10 mg) by mouth once daily.  Medication Adjustments for Surgery: Take on the morning of surgery     CALTRATE 600 ORAL  Additional Medication Adjustments for Surgery: Take last dose 7 days before surgery  Notes to patient: Last day to take is 6/10  Hold until after surgery or as directed     cetirizine 10 mg tablet  Commonly known as: ZyrTEC  Take 1 tablet (10 mg) by mouth once daily.  Medication Adjustments for Surgery: Take last dose 1 day (24 hours) before surgery  Notes to patient: Hold the night before and the day of surgery= Hold x 24 hours     * chlorhexidine 0.12 % solution  Commonly known as: Peridex  Swish and Spit 15 mLs by mouth on the day before surgery and again the morning of surgery.  Medication Adjustments for Surgery: Take/Use as prescribed     * Magali-Hex 4 % external liquid  Generic drug: chlorhexidine  Wash topically daily for 5 days prior to surgery with day 5 being morning of surgery.  Medication Adjustments for Surgery: Take/Use as prescribed     etodolac 400 mg tablet  Commonly known as: Lodine  Take 1 tablet (400 mg) by mouth 2 times a day.  Additional Medication Adjustments for Surgery: Take last dose 7 days before surgery  Notes to patient: Last day to take is 6/10  Hold until after surgery or as directed     fluticasone 50 mcg/actuation nasal spray  Commonly known as: Flonase  Administer 2 sprays into each nostril once daily.  Medication Adjustments for Surgery: Take/Use as prescribed     Krill Oil (Omega 3 and 6) 1000-130(40-80) mg capsule  Generic drug: [id number]  Additional Medication Adjustments  for Surgery: Take last dose 7 days before surgery  Notes to patient: Last day to take is 6/10  Hold until after surgery or as directed     lisinopril 20 mg tablet  Take 1 tablet (20 mg) by mouth once daily.  Medication Adjustments for Surgery: Take last dose 1 day (24 hours) before surgery  Notes to patient: Hold the night before and the day of surgery= Hold x 24 hours     montelukast 10 mg tablet  Commonly known as: Singulair  Take 1 tablet (10 mg) by mouth once daily at bedtime.  Medication Adjustments for Surgery: Take/Use as prescribed     multivitamin tablet  Additional Medication Adjustments for Surgery: Take last dose 7 days before surgery  Notes to patient: Last day to take is 6/10  Hold until after surgery or as directed     sodium hyaluronate 16.8 mg/2 mL injection  Commonly known as: Gelsyn-3  Inject 2 mL (16.8 mg) into the joint every 7 days. Inject one 16.8mg/2ml prefilled syringe intra-articularly into the left knee once weekly for three weeks; Qty: 1 series, 3 injections  Additional Medication Adjustments for Surgery: Other (Comment)     Trulicity 4.5 mg/0.5 mL pen injector  Generic drug: dulaglutide  Inject 4.5 mg under the skin 1 (one) time per week.  Medication Adjustments for Surgery: Do Not take on the morning of surgery  Additional Medication Adjustments for Surgery: Other (Comment)  Notes to patient: Takes on Mondays, with last dose to be 6/16  Follow diet restrictions provided           * This list has 2 medication(s) that are the same as other medications prescribed for you. Read the directions carefully, and ask your doctor or other care provider to review them with you.                                  NPO Instructions:    Do not eat any food after midnight the night before your surgery/procedure.    Additional Instructions:     Day of Surgery:  You may have clear liquids until TWO hours before surgery/procedure.  This includes water, black tea/coffee, (no milk or cream) apple juice and  electrolyte drinks (Gatorade)  Wear  comfortable loose fitting clothing  Do not use moisturizers, creams, lotions or perfume  All jewelry and valuables should be left at home  PRE-OPERATIVE INSTRUCTIONS FOR SURGERY    *Do not eat anything after midnight the night of surgery.  This includes food of any kind (including hard candy, cough drops, mints).   You may have up to 13 ounces of clear liquid  until TWO hours prior to your scheduled arrival time.  Clear liquids include water, black tea/coffee, (no milk or cream) apple juice and electrolyte drinks (GATORADE).  You may chew gum until TWO hours prior you your surgery/procedure.         *One of our staff members will call you ONE business day before your surgery, between 11 am-2 pm to let you know the time to arrive.  If you have not received a call by 2 pm, call [phone]    *When you arrive at the hospital-->GO TO Registration on the ground floor  *Stop smoking 24 hours prior to surgery.  No Marijuana, CBD Oil or Vaping for 48 hours  *No alcohol 24 hours prior to surgery  *You will need a responsible adult to drive you home  -No acrylic nails or nail polish on at least one fingernail, NO polish on toes for foot surgery  -You may be asked to remove your dentures, partial plate, eyeglasses or contact lenses before going to surgery.  Please bring a case for these items.  -Body piercings need to be removed.  Jewelry and valuables should be left at home.  -Put on loose,  comfortable, clean clothing, that will accommodate bandages        What is a home antibacterial shower?  This shower is a way of cleaning the skin with a germ killing solution before surgery.  The solution contains chlorhexidine, commonly known as CHG.  CHG is a skin cleanser with germ killing ability.  Let your doctor know if you are allergic to chlorhexidine.    Why do I need to take a preoperative antibacterial shower?  Skin is not sterile.  It is best to try to make your skin as free of germs as  possible before surgery.  Proper cleansing with a germ killing soap before surgery can lower the number of germs on your skin.  This helps to reduce the risk of infection at the surgical site.  Following the instructions listed below will help you prepare your skin for surgery.      How do I use the solution?    Steps: Begin using your CHG soap 5 days before your surgery     *First, wash and rinse your hair using the CHG soap.  Keep CHG soap away from ear canals and eyes.   Rinse completely, do not condition.  Hair extensions should be removed.    *Wash your face with your normal soap and rinse.   *Apply the CHG solution to a clean wet washcloth.  Turn the water off or move away from the water spray to avoid premature rinsing of the CHG soap as you are applying.  Firmly lather your entire body from the neck down.  Do not use on your face.    *Pay special attention to the area(s) where your incision(s) will be located unless they are on your face.  Avoid scrubbing your skin too hard.  The important part is to have the CHG soap sit on your skin for 3 minutes.   *When the 3 minutes are up, turn on the water and rinse the CHG solution off your body completely.  *Do not wash with regular soap after you  have  used the CHG soap solution.  *Pat  yourself dry with a clean, freshly laundered towel.  *Do not apply powders, deodorants or lotions.  *Dress in clean freshly laundered night clothes.    *Be sure to sleep with clean freshly laundered sheets.    *Be aware the CHG will cause stains on fabrics; if you wash them with bleach after use.  Rinse your washcloth and other linens that have contact with CHG completely.  Use only non-chlorine detergents to launder the items  used.  *The morning of surgery is the fifth day.  Repeat the above steps and dress in clean comfortable clothing.     What is oral/dental rinse?  It is mouthwash.  It is a way of cleaning the he mouth with a germ-killing solution before your surgery.  The  solution contains chlorhexidine, commonly known as CHG.  It is used inside the mouth to kill a bacteria known as Staphylococcus aureus.  Let your doctor know if you are allergic to Chlorhexidine.    Why do I need to use CHG oral/ dental rinse?  The CHG oral/dental rinse helps to kill bacteria in your mouth know as Staphylococcus aureus.  This reduces the risk of infection at the surgical site.    Using your CHG oral/dental rinse    STEPS:    Use your CHG oral/dental rinse after you brush your teeth the night before (at bedtime) and the morning of your surgery.  Follow all the directions on your prescription label.  *Use the cap on the container to measure 15 ml  *Swish (gargle if you can) the mouthwash in your mouth for at least 30 seconds, (do not swallow) and spit out  *After you use your CHG rinse, do not rinse your mouth with water, drink or eat.  Please refer to the prescription label for the appropriate time to resume oral intake.    What side effects might I have using the CHG oral/dental rinse?  CHG rinse will stick you plaque on the teeth.  Brush and floss just before use.   Teeth brushing will help avoid staining of the plaque during  use.  Teeth brushing will help avoid staining of plaque during  use.      *If you have any further questions about your pre-op instructions,  not mentioned in this handout, then call [phone]*    What you may be asked to bring to surgery:  ___CPAP machine      Preoperative Fasting Guidelines    Why must I stop eating and drinking before surgery?  With anesthesia, food or liquid in your stomach can enter your lungs causing serious complications  GLP-1 medications can slow the movement of food through your stomach and intestines.  This further increases the risk of food entering your lungs with anesthesia    When do I need to stop eating and drinking before my surgery?  To help ensure food has passed out of your stomach, START a clear liquid diet 24 hours before your  surgery  On the day of your surgery/procedure, STOP all clear liquids 2 hours before your arrival time to the hospital/facility     A clear liquid diet consists of clear liquids and foods that melt into a clear liquid (i.e. gelatin) and excludes solid foods and liquids you cannot see through (i.e. milk). Clears can and should contain sugar to obtain a sufficient number of calories.  A clear liquid diet includes  Clear, fat-free broth  Clear nutritional drinks  Pulp-free popsicles, vegetable and fruit juice  Gelatin  Coffee and tea without creamer or milk  Clear soda and sports drinks    Diabetic Patients  Clear liquids should not be sugar-free   Check your blood glucose levels as you normally do  If you have symptoms of low blood glucose (shakiness, sweating, dizziness, confusion) or high blood glucose (dry mouth, excessive thirst, frequent urination, blurry vision), check your blood glucose level  For low blood glucose increase your consumption of sugar-containing clear liquid   For high blood glucose, decrease your consumption of sugar-containing clears and treat as you normally would  If symptoms persist seek medical attention    Examples of GLP-1 Medications  Kevin Abbott

## 2025-06-05 LAB — STAPHYLOCOCCUS SPEC CULT: NORMAL

## 2025-06-08 LAB
ATRIAL RATE: 72 BPM
P AXIS: 54 DEGREES
P OFFSET: 165 MS
P ONSET: 116 MS
PR INTERVAL: 194 MS
Q ONSET: 213 MS
QRS COUNT: 11 BEATS
QRS DURATION: 110 MS
QT INTERVAL: 358 MS
QTC CALCULATION(BAZETT): 392 MS
QTC FREDERICIA: 380 MS
R AXIS: 45 DEGREES
T AXIS: 35 DEGREES
T OFFSET: 392 MS
VENTRICULAR RATE: 72 BPM

## 2025-07-09 ENCOUNTER — APPOINTMENT (OUTPATIENT)
Dept: PHARMACY | Facility: HOSPITAL | Age: 55
End: 2025-07-09
Payer: COMMERCIAL

## 2025-08-13 ENCOUNTER — APPOINTMENT (OUTPATIENT)
Dept: PRIMARY CARE | Facility: CLINIC | Age: 55
End: 2025-08-13
Payer: COMMERCIAL

## 2025-08-13 VITALS
DIASTOLIC BLOOD PRESSURE: 80 MMHG | TEMPERATURE: 98.1 F | BODY MASS INDEX: 41.47 KG/M2 | HEIGHT: 69 IN | HEART RATE: 81 BPM | OXYGEN SATURATION: 98 % | SYSTOLIC BLOOD PRESSURE: 122 MMHG | WEIGHT: 280 LBS

## 2025-08-13 DIAGNOSIS — E66.1 CLASS 3 DRUG-INDUCED OBESITY WITH BODY MASS INDEX (BMI) OF 50.0 TO 59.9 IN ADULT, UNSPECIFIED WHETHER SERIOUS COMORBIDITY PRESENT: ICD-10-CM

## 2025-08-13 DIAGNOSIS — I10 ESSENTIAL HYPERTENSION: ICD-10-CM

## 2025-08-13 DIAGNOSIS — M25.59 PAIN IN OTHER JOINT: ICD-10-CM

## 2025-08-13 DIAGNOSIS — J45.909 ASTHMA, UNSPECIFIED ASTHMA SEVERITY, UNSPECIFIED WHETHER COMPLICATED, UNSPECIFIED WHETHER PERSISTENT (HHS-HCC): ICD-10-CM

## 2025-08-13 DIAGNOSIS — E66.813 CLASS 3 DRUG-INDUCED OBESITY WITH BODY MASS INDEX (BMI) OF 50.0 TO 59.9 IN ADULT, UNSPECIFIED WHETHER SERIOUS COMORBIDITY PRESENT: ICD-10-CM

## 2025-08-13 DIAGNOSIS — E78.5 DYSLIPIDEMIA: ICD-10-CM

## 2025-08-13 DIAGNOSIS — E66.2 CLASS 3 OBESITY WITH ALVEOLAR HYPOVENTILATION, SERIOUS COMORBIDITY, AND BODY MASS INDEX (BMI) OF 60.0 TO 69.9 IN ADULT: ICD-10-CM

## 2025-08-13 DIAGNOSIS — E66.813 CLASS 3 OBESITY WITH ALVEOLAR HYPOVENTILATION, SERIOUS COMORBIDITY, AND BODY MASS INDEX (BMI) OF 60.0 TO 69.9 IN ADULT: ICD-10-CM

## 2025-08-13 PROCEDURE — 99214 OFFICE O/P EST MOD 30 MIN: CPT | Performed by: INTERNAL MEDICINE

## 2025-08-13 PROCEDURE — 3074F SYST BP LT 130 MM HG: CPT | Performed by: INTERNAL MEDICINE

## 2025-08-13 PROCEDURE — 3008F BODY MASS INDEX DOCD: CPT | Performed by: INTERNAL MEDICINE

## 2025-08-13 PROCEDURE — 3079F DIAST BP 80-89 MM HG: CPT | Performed by: INTERNAL MEDICINE

## 2025-08-13 RX ORDER — ATORVASTATIN CALCIUM 10 MG/1
10 TABLET, FILM COATED ORAL DAILY
Qty: 30 TABLET | Refills: 5 | Status: SHIPPED | OUTPATIENT
Start: 2025-08-13 | End: 2026-02-09

## 2025-08-13 RX ORDER — MONTELUKAST SODIUM 10 MG/1
10 TABLET ORAL NIGHTLY
Qty: 30 TABLET | Refills: 5 | Status: SHIPPED | OUTPATIENT
Start: 2025-08-13

## 2025-08-13 RX ORDER — CETIRIZINE HYDROCHLORIDE 10 MG/1
10 TABLET ORAL DAILY
Qty: 30 TABLET | Refills: 5 | Status: SHIPPED | OUTPATIENT
Start: 2025-08-13

## 2025-08-13 RX ORDER — LISINOPRIL 20 MG/1
20 TABLET ORAL DAILY
Qty: 30 TABLET | Refills: 5 | Status: SHIPPED | OUTPATIENT
Start: 2025-08-13

## 2025-08-13 RX ORDER — FLUTICASONE PROPIONATE 50 MCG
2 SPRAY, SUSPENSION (ML) NASAL
Qty: 16 G | Refills: 5 | Status: SHIPPED | OUTPATIENT
Start: 2025-08-13

## 2025-08-13 RX ORDER — DULAGLUTIDE 4.5 MG/.5ML
4.5 INJECTION, SOLUTION SUBCUTANEOUS
Qty: 6 ML | Refills: 1 | Status: CANCELLED | OUTPATIENT
Start: 2025-08-17

## 2025-08-13 RX ORDER — ETODOLAC 400 MG/1
400 TABLET, FILM COATED ORAL 2 TIMES DAILY
Qty: 60 TABLET | Refills: 5 | Status: SHIPPED | OUTPATIENT
Start: 2025-08-13

## 2025-08-13 ASSESSMENT — ENCOUNTER SYMPTOMS
DEPRESSION: 0
OCCASIONAL FEELINGS OF UNSTEADINESS: 0
LOSS OF SENSATION IN FEET: 0
